# Patient Record
Sex: MALE | Race: WHITE | NOT HISPANIC OR LATINO | Employment: OTHER | URBAN - METROPOLITAN AREA
[De-identification: names, ages, dates, MRNs, and addresses within clinical notes are randomized per-mention and may not be internally consistent; named-entity substitution may affect disease eponyms.]

---

## 2018-06-06 ENCOUNTER — APPOINTMENT (OUTPATIENT)
Dept: GENERAL RADIOLOGY | Facility: CLINIC | Age: 78
DRG: 872 | End: 2018-06-06
Attending: EMERGENCY MEDICINE
Payer: MEDICARE

## 2018-06-06 ENCOUNTER — HOSPITAL ENCOUNTER (INPATIENT)
Facility: CLINIC | Age: 78
LOS: 4 days | Discharge: HOME | DRG: 872 | End: 2018-06-10
Attending: EMERGENCY MEDICINE | Admitting: INTERNAL MEDICINE
Payer: MEDICARE

## 2018-06-06 ENCOUNTER — APPOINTMENT (OUTPATIENT)
Dept: CT IMAGING | Facility: CLINIC | Age: 78
DRG: 872 | End: 2018-06-06
Attending: EMERGENCY MEDICINE
Payer: MEDICARE

## 2018-06-06 DIAGNOSIS — A04.72 C. DIFFICILE COLITIS: Primary | ICD-10-CM

## 2018-06-06 DIAGNOSIS — R53.1 GENERALIZED WEAKNESS: ICD-10-CM

## 2018-06-06 PROBLEM — R65.10 SIRS (SYSTEMIC INFLAMMATORY RESPONSE SYNDROME) (H): Status: ACTIVE | Noted: 2018-06-06

## 2018-06-06 LAB
ALBUMIN SERPL-MCNC: 3.6 G/DL (ref 3.4–5)
ALBUMIN UR-MCNC: NEGATIVE MG/DL
ALP SERPL-CCNC: 102 U/L (ref 40–150)
ALT SERPL W P-5'-P-CCNC: 45 U/L (ref 0–70)
ANION GAP SERPL CALCULATED.3IONS-SCNC: 7 MMOL/L (ref 3–14)
APPEARANCE UR: CLEAR
AST SERPL W P-5'-P-CCNC: 46 U/L (ref 0–45)
BASOPHILS # BLD AUTO: 0 10E9/L (ref 0–0.2)
BASOPHILS NFR BLD AUTO: 0.6 %
BILIRUB SERPL-MCNC: 1 MG/DL (ref 0.2–1.3)
BILIRUB UR QL STRIP: NEGATIVE
BUN SERPL-MCNC: 14 MG/DL (ref 7–30)
CALCIUM SERPL-MCNC: 8.5 MG/DL (ref 8.5–10.1)
CHLORIDE SERPL-SCNC: 107 MMOL/L (ref 94–109)
CO2 SERPL-SCNC: 24 MMOL/L (ref 20–32)
COLOR UR AUTO: YELLOW
CREAT SERPL-MCNC: 1.34 MG/DL (ref 0.66–1.25)
DIFFERENTIAL METHOD BLD: ABNORMAL
EOSINOPHIL # BLD AUTO: 0 10E9/L (ref 0–0.7)
EOSINOPHIL NFR BLD AUTO: 0.3 %
ERYTHROCYTE [DISTWIDTH] IN BLOOD BY AUTOMATED COUNT: 14 % (ref 10–15)
FLUAV+FLUBV RNA SPEC QL NAA+PROBE: NEGATIVE
FLUAV+FLUBV RNA SPEC QL NAA+PROBE: NEGATIVE
GFR SERPL CREATININE-BSD FRML MDRD: 51 ML/MIN/1.7M2
GLUCOSE SERPL-MCNC: 100 MG/DL (ref 70–99)
GLUCOSE UR STRIP-MCNC: NEGATIVE MG/DL
HCT VFR BLD AUTO: 44.6 % (ref 40–53)
HGB BLD-MCNC: 15.1 G/DL (ref 13.3–17.7)
HGB UR QL STRIP: NEGATIVE
IMM GRANULOCYTES # BLD: 0 10E9/L (ref 0–0.4)
IMM GRANULOCYTES NFR BLD: 0.3 %
KETONES UR STRIP-MCNC: NEGATIVE MG/DL
LACTATE BLD-SCNC: 1.6 MMOL/L (ref 0.4–1.9)
LEUKOCYTE ESTERASE UR QL STRIP: NEGATIVE
LYMPHOCYTES # BLD AUTO: 0.7 10E9/L (ref 0.8–5.3)
LYMPHOCYTES NFR BLD AUTO: 10.6 %
MCH RBC QN AUTO: 29.5 PG (ref 26.5–33)
MCHC RBC AUTO-ENTMCNC: 33.9 G/DL (ref 31.5–36.5)
MCV RBC AUTO: 87 FL (ref 78–100)
MONOCYTES # BLD AUTO: 0.4 10E9/L (ref 0–1.3)
MONOCYTES NFR BLD AUTO: 6.4 %
MUCOUS THREADS #/AREA URNS LPF: PRESENT /LPF
NEUTROPHILS # BLD AUTO: 5.1 10E9/L (ref 1.6–8.3)
NEUTROPHILS NFR BLD AUTO: 81.8 %
NITRATE UR QL: NEGATIVE
NRBC # BLD AUTO: 0 10*3/UL
NRBC BLD AUTO-RTO: 0 /100
PH UR STRIP: 6 PH (ref 5–7)
PLATELET # BLD AUTO: 186 10E9/L (ref 150–450)
POTASSIUM SERPL-SCNC: 4.3 MMOL/L (ref 3.4–5.3)
PROT SERPL-MCNC: 7.8 G/DL (ref 6.8–8.8)
RBC # BLD AUTO: 5.11 10E12/L (ref 4.4–5.9)
RBC #/AREA URNS AUTO: 1 /HPF (ref 0–2)
RSV RNA SPEC NAA+PROBE: NEGATIVE
SODIUM SERPL-SCNC: 138 MMOL/L (ref 133–144)
SOURCE: ABNORMAL
SP GR UR STRIP: 1.01 (ref 1–1.03)
SPECIMEN SOURCE: NORMAL
SQUAMOUS #/AREA URNS AUTO: <1 /HPF (ref 0–1)
TROPONIN I SERPL-MCNC: <0.015 UG/L (ref 0–0.04)
UROBILINOGEN UR STRIP-MCNC: NORMAL MG/DL (ref 0–2)
WBC # BLD AUTO: 6.2 10E9/L (ref 4–11)
WBC #/AREA URNS AUTO: 1 /HPF (ref 0–5)

## 2018-06-06 PROCEDURE — 99223 1ST HOSP IP/OBS HIGH 75: CPT | Mod: AI | Performed by: INTERNAL MEDICINE

## 2018-06-06 PROCEDURE — 93005 ELECTROCARDIOGRAM TRACING: CPT

## 2018-06-06 PROCEDURE — 74177 CT ABD & PELVIS W/CONTRAST: CPT

## 2018-06-06 PROCEDURE — 96374 THER/PROPH/DIAG INJ IV PUSH: CPT

## 2018-06-06 PROCEDURE — 85025 COMPLETE CBC W/AUTO DIFF WBC: CPT | Performed by: EMERGENCY MEDICINE

## 2018-06-06 PROCEDURE — A9270 NON-COVERED ITEM OR SERVICE: HCPCS | Mod: GY | Performed by: EMERGENCY MEDICINE

## 2018-06-06 PROCEDURE — 25000128 H RX IP 250 OP 636: Performed by: INTERNAL MEDICINE

## 2018-06-06 PROCEDURE — 86617 LYME DISEASE ANTIBODY: CPT | Performed by: EMERGENCY MEDICINE

## 2018-06-06 PROCEDURE — 25000128 H RX IP 250 OP 636: Performed by: EMERGENCY MEDICINE

## 2018-06-06 PROCEDURE — 81001 URINALYSIS AUTO W/SCOPE: CPT | Performed by: EMERGENCY MEDICINE

## 2018-06-06 PROCEDURE — 87040 BLOOD CULTURE FOR BACTERIA: CPT | Performed by: EMERGENCY MEDICINE

## 2018-06-06 PROCEDURE — 36415 COLL VENOUS BLD VENIPUNCTURE: CPT

## 2018-06-06 PROCEDURE — 84484 ASSAY OF TROPONIN QUANT: CPT | Performed by: EMERGENCY MEDICINE

## 2018-06-06 PROCEDURE — 87631 RESP VIRUS 3-5 TARGETS: CPT | Performed by: EMERGENCY MEDICINE

## 2018-06-06 PROCEDURE — 80053 COMPREHEN METABOLIC PANEL: CPT | Performed by: EMERGENCY MEDICINE

## 2018-06-06 PROCEDURE — 99285 EMERGENCY DEPT VISIT HI MDM: CPT | Mod: 25

## 2018-06-06 PROCEDURE — 71046 X-RAY EXAM CHEST 2 VIEWS: CPT

## 2018-06-06 PROCEDURE — A9270 NON-COVERED ITEM OR SERVICE: HCPCS | Mod: GY | Performed by: INTERNAL MEDICINE

## 2018-06-06 PROCEDURE — 25000132 ZZH RX MED GY IP 250 OP 250 PS 637: Mod: GY | Performed by: INTERNAL MEDICINE

## 2018-06-06 PROCEDURE — 96361 HYDRATE IV INFUSION ADD-ON: CPT

## 2018-06-06 PROCEDURE — 12000000 ZZH R&B MED SURG/OB

## 2018-06-06 PROCEDURE — 25000132 ZZH RX MED GY IP 250 OP 250 PS 637: Mod: GY | Performed by: EMERGENCY MEDICINE

## 2018-06-06 PROCEDURE — 83605 ASSAY OF LACTIC ACID: CPT | Performed by: EMERGENCY MEDICINE

## 2018-06-06 PROCEDURE — 25000125 ZZHC RX 250: Performed by: EMERGENCY MEDICINE

## 2018-06-06 RX ORDER — IOPAMIDOL 755 MG/ML
111 INJECTION, SOLUTION INTRAVASCULAR ONCE
Status: COMPLETED | OUTPATIENT
Start: 2018-06-06 | End: 2018-06-06

## 2018-06-06 RX ORDER — ACETAMINOPHEN 325 MG/1
650 TABLET ORAL EVERY 4 HOURS PRN
Status: DISCONTINUED | OUTPATIENT
Start: 2018-06-06 | End: 2018-06-10 | Stop reason: HOSPADM

## 2018-06-06 RX ORDER — IBUPROFEN 400 MG/1
400 TABLET, FILM COATED ORAL EVERY 8 HOURS PRN
Status: DISCONTINUED | OUTPATIENT
Start: 2018-06-06 | End: 2018-06-10 | Stop reason: HOSPADM

## 2018-06-06 RX ORDER — ALBUTEROL SULFATE 90 UG/1
1-2 AEROSOL, METERED RESPIRATORY (INHALATION) EVERY 6 HOURS PRN
COMMUNITY

## 2018-06-06 RX ORDER — OXYCODONE HYDROCHLORIDE 5 MG/1
5 TABLET ORAL EVERY 4 HOURS PRN
Status: DISCONTINUED | OUTPATIENT
Start: 2018-06-06 | End: 2018-06-10 | Stop reason: HOSPADM

## 2018-06-06 RX ORDER — ONDANSETRON 2 MG/ML
4 INJECTION INTRAMUSCULAR; INTRAVENOUS EVERY 6 HOURS PRN
Status: DISCONTINUED | OUTPATIENT
Start: 2018-06-06 | End: 2018-06-10 | Stop reason: HOSPADM

## 2018-06-06 RX ORDER — ACETAMINOPHEN 500 MG
1000 TABLET ORAL ONCE
Status: COMPLETED | OUTPATIENT
Start: 2018-06-06 | End: 2018-06-06

## 2018-06-06 RX ORDER — LIDOCAINE 40 MG/G
CREAM TOPICAL
Status: DISCONTINUED | OUTPATIENT
Start: 2018-06-06 | End: 2018-06-10 | Stop reason: HOSPADM

## 2018-06-06 RX ORDER — SODIUM CHLORIDE 9 MG/ML
INJECTION, SOLUTION INTRAVENOUS CONTINUOUS
Status: DISCONTINUED | OUTPATIENT
Start: 2018-06-06 | End: 2018-06-10

## 2018-06-06 RX ORDER — HEPARIN SODIUM 5000 [USP'U]/.5ML
5000 INJECTION, SOLUTION INTRAVENOUS; SUBCUTANEOUS EVERY 12 HOURS
Status: DISCONTINUED | OUTPATIENT
Start: 2018-06-06 | End: 2018-06-10 | Stop reason: HOSPADM

## 2018-06-06 RX ORDER — ACETAMINOPHEN 650 MG/1
650 SUPPOSITORY RECTAL EVERY 4 HOURS PRN
Status: DISCONTINUED | OUTPATIENT
Start: 2018-06-06 | End: 2018-06-10 | Stop reason: HOSPADM

## 2018-06-06 RX ORDER — ALBUTEROL SULFATE 90 UG/1
1-2 AEROSOL, METERED RESPIRATORY (INHALATION) EVERY 6 HOURS PRN
Status: DISCONTINUED | OUTPATIENT
Start: 2018-06-06 | End: 2018-06-10 | Stop reason: HOSPADM

## 2018-06-06 RX ORDER — NALOXONE HYDROCHLORIDE 0.4 MG/ML
.1-.4 INJECTION, SOLUTION INTRAMUSCULAR; INTRAVENOUS; SUBCUTANEOUS
Status: DISCONTINUED | OUTPATIENT
Start: 2018-06-06 | End: 2018-06-10 | Stop reason: HOSPADM

## 2018-06-06 RX ORDER — PIPERACILLIN SODIUM, TAZOBACTAM SODIUM 3; .375 G/15ML; G/15ML
3.38 INJECTION, POWDER, LYOPHILIZED, FOR SOLUTION INTRAVENOUS ONCE
Status: COMPLETED | OUTPATIENT
Start: 2018-06-06 | End: 2018-06-06

## 2018-06-06 RX ORDER — ONDANSETRON 4 MG/1
4 TABLET, ORALLY DISINTEGRATING ORAL EVERY 6 HOURS PRN
Status: DISCONTINUED | OUTPATIENT
Start: 2018-06-06 | End: 2018-06-10 | Stop reason: HOSPADM

## 2018-06-06 RX ORDER — IPRATROPIUM BROMIDE AND ALBUTEROL SULFATE 2.5; .5 MG/3ML; MG/3ML
3 SOLUTION RESPIRATORY (INHALATION) EVERY 4 HOURS PRN
Status: DISCONTINUED | OUTPATIENT
Start: 2018-06-06 | End: 2018-06-10 | Stop reason: HOSPADM

## 2018-06-06 RX ORDER — LISINOPRIL 20 MG/1
20 TABLET ORAL DAILY
Status: DISCONTINUED | OUTPATIENT
Start: 2018-06-07 | End: 2018-06-10 | Stop reason: HOSPADM

## 2018-06-06 RX ORDER — PIPERACILLIN SODIUM, TAZOBACTAM SODIUM 3; .375 G/15ML; G/15ML
3.38 INJECTION, POWDER, LYOPHILIZED, FOR SOLUTION INTRAVENOUS EVERY 6 HOURS
Status: DISCONTINUED | OUTPATIENT
Start: 2018-06-07 | End: 2018-06-07

## 2018-06-06 RX ADMIN — SODIUM CHLORIDE: 9 INJECTION, SOLUTION INTRAVENOUS at 21:13

## 2018-06-06 RX ADMIN — HEPARIN SODIUM 5000 UNITS: 5000 INJECTION, SOLUTION INTRAVENOUS; SUBCUTANEOUS at 22:05

## 2018-06-06 RX ADMIN — SODIUM CHLORIDE 1000 ML: 9 INJECTION, SOLUTION INTRAVENOUS at 15:52

## 2018-06-06 RX ADMIN — FLUTICASONE FUROATE AND VILANTEROL TRIFENATATE 1 PUFF: 100; 25 POWDER RESPIRATORY (INHALATION) at 23:52

## 2018-06-06 RX ADMIN — IOPAMIDOL 111 ML: 755 INJECTION, SOLUTION INTRAVENOUS at 16:49

## 2018-06-06 RX ADMIN — SODIUM CHLORIDE 74 ML: 9 INJECTION, SOLUTION INTRAVENOUS at 16:50

## 2018-06-06 RX ADMIN — PIPERACILLIN SODIUM,TAZOBACTAM SODIUM 3.38 G: 3; .375 INJECTION, POWDER, FOR SOLUTION INTRAVENOUS at 20:18

## 2018-06-06 RX ADMIN — ACETAMINOPHEN 1000 MG: 500 TABLET, FILM COATED ORAL at 16:35

## 2018-06-06 RX ADMIN — IBUPROFEN 400 MG: 400 TABLET ORAL at 22:01

## 2018-06-06 ASSESSMENT — ENCOUNTER SYMPTOMS
FREQUENCY: 0
ABDOMINAL PAIN: 0
DIARRHEA: 0
HEADACHES: 0
WEAKNESS: 1
BACK PAIN: 0
COUGH: 0
RHINORRHEA: 0
APPETITE CHANGE: 1
NECK PAIN: 0
FEVER: 1
DYSURIA: 0

## 2018-06-06 NOTE — IP AVS SNAPSHOT
[address], [address]    [facility] [address]    Phone:  [phone]                                       After Visit Summary   6/6/2018    Jamie Urban    MRN: [id number]           After Visit Summary Signature Page     I have received my discharge instructions, and my questions have been answered. I have discussed any challenges I see with this plan with the nurse or doctor.    ..........................................................................................................................................  Patient/Patient Representative Signature      ..........................................................................................................................................  Patient Representative Print Name and Relationship to Patient    ..................................................               ................................................  Date                                            Time    ..........................................................................................................................................  Reviewed by Signature/Title    ...................................................              ..............................................  Date                                                            Time

## 2018-06-06 NOTE — ED NOTES
[facility]  ED Nurse Handoff Report    ED Chief complaint: Generalized Weakness (Fever 102.6 oral. )      ED Diagnosis:   Final diagnoses:   Generalized weakness       Code Status: Full Code    Allergies:   Allergies   Allergen Reactions     Sulfa Drugs      Varenicline      Other reaction(s): Hallucinations     Bupropion Anxiety     Other reaction(s): Tremors  Patient unaware       Activity level - Baseline/Home:  Stand with Assist    Activity Level - Current:   Stand with Assist of 2     Needed?: No    Isolation: No  Infection: Not Applicable    Bariatric?: No    Vital Signs:   Vitals:    06/06/18 1730 06/06/18 1800 06/06/18 1830 06/06/18 1900   BP: 101/70 112/70 124/86 130/86   Resp: 16 18 18 14   Temp:       TempSrc:       SpO2: 93% 94% 95% 92%   Weight:       Height:           Cardiac Rhythm: ,    RBBB on EKG    Pain level: 0-10 Pain Scale: 8    Is this patient confused?: No   Suicidality: Screened negative    Patient Report: Initial Complaint: Pt with hx of COPD/diverticulitis presents to ED for inc weakness, fever and nausea that started last night. Pt has had dec appetite and did not have a BM yet today.  2 wks ago pt was seen for diverticulitis. Pt had temp 102.4F on arrival to ED and reported SOB which family says is normal for pt. Hx of lyme disease and family says his sx were similar last time he had a flare up. Improved sx after 1L 0.9NS bolus  Focused Assessment: AOx3. Skin hot/dry on arrival. No distress. Lungs cta.   Tests Performed: labs, CT, xray, EKG  Abnormal Results:   Results for orders placed or performed during the hospital encounter of 06/06/18   XR Chest 2 Views    Narrative    CHEST TWO VIEWS   6/6/2018 5:00 PM     HISTORY: Fever, SIRS, evaluate pneumonia.     COMPARISON: None.    FINDINGS: Elevated right hemidiaphragm. Chest otherwise negative.      Impression    IMPRESSION: Elevated right hemidiaphragm. Nothing acute.   CT Abdomen Pelvis w Contrast     Narrative    CT ABDOMEN AND PELVIS WITH CONTRAST   6/6/2018 4:53 PM     HISTORY: Recently diagnosed diverticulitis. Now with mild left lower  quadrant tenderness.    COMPARISON: 2/22/2014.    TECHNIQUE: Following the uneventful administration of 111mL Isovue-370  intravenous contrast, helical sections were acquired from the top of  the diaphragm through the pubic symphysis. Coronal reconstructions  were generated. Radiation dose for this scan was reduced using  automated exposure control, adjustment of the mA and/or kV according  to the patient's size, or iterative reconstruction technique.    FINDINGS:     Abdomen: The liver is unremarkable. Mild splenomegaly. The spleen  measures 13.9 cm in craniocaudal dimension. A few punctate  calcifications seen in the pancreas may relate to chronic  pancreatitis. The adrenal glands are unremarkable. Two small cysts in  the right kidney. A few subcentimeter low-attenuation lesions in both  kidneys, too small to characterize. Two nonobstructing calculi in the  inferior pole of the left kidney, the largest measuring 0.5 cm. The  gallbladder is present. No enlarged lymph nodes or free fluid in the  upper abdomen. Atherosclerotic calcification in the abdominal aorta.  Mild aneurysmal dilatation of the distal abdominal aorta which  measures 3.6 x 3.2 cm in axial dimensions. 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 Marked elevation  of the right hemidiaphragm.    Scan through the lower chest is significant for atelectasis in the  right lung base. Coronary artery calcification.    Pelvis: The small and large bowel are normal in caliber. Several  colonic diverticula are present without convincing evidence of  diverticulitis. Apparent mild wall thickening of the cecum and  proximal ascending colon. Slight haziness is present in the fat about  the thick-walled colon. The appendix is not visualized. No pneumatosis  or free intraperitoneal gas. No  enlarged lymph nodes or free fluid in  the pelvis. Small right inguinal hernia containing fat.      Impression    IMPRESSION:   1. Apparent mild wall thickening of the cecum and proximal ascending  colon. This is suggestive of infectious or inflammatory colitis.  Recommend correlation with patient's symptoms.  2. No other cause of acute pain identified in the abdomen or pelvis.  3. Colonic diverticulosis without convincing evidence of  diverticulitis.  4. Mild splenomegaly.  5. Two nonobstructing left renal calculi.  6. 3.6 cm distal abdominal aortic aneurysm.   Comprehensive metabolic panel   Result Value Ref Range    Sodium 138 133 - 144 mmol/L    Potassium 4.3 3.4 - 5.3 mmol/L    Chloride 107 94 - 109 mmol/L    Carbon Dioxide 24 20 - 32 mmol/L    Anion Gap 7 3 - 14 mmol/L    Glucose 100 (H) 70 - 99 mg/dL    Urea Nitrogen 14 7 - 30 mg/dL    Creatinine 1.34 (H) 0.66 - 1.25 mg/dL    GFR Estimate 51 (L) >60 mL/min/1.7m2    GFR Estimate If Black 62 >60 mL/min/1.7m2    Calcium 8.5 8.5 - 10.1 mg/dL    Bilirubin Total 1.0 0.2 - 1.3 mg/dL    Albumin 3.6 3.4 - 5.0 g/dL    Protein Total 7.8 6.8 - 8.8 g/dL    Alkaline Phosphatase 102 40 - 150 U/L    ALT 45 0 - 70 U/L    AST 46 (H) 0 - 45 U/L   CBC with platelets differential   Result Value Ref Range    WBC 6.2 4.0 - 11.0 10e9/L    RBC Count 5.11 4.4 - 5.9 10e12/L    Hemoglobin 15.1 13.3 - 17.7 g/dL    Hematocrit 44.6 40.0 - 53.0 %    MCV 87 78 - 100 fl    MCH 29.5 26.5 - 33.0 pg    MCHC 33.9 31.5 - 36.5 g/dL    RDW 14.0 10.0 - 15.0 %    Platelet Count 186 150 - 450 10e9/L    Diff Method Automated Method     % Neutrophils 81.8 %    % Lymphocytes 10.6 %    % Monocytes 6.4 %    % Eosinophils 0.3 %    % Basophils 0.6 %    % Immature Granulocytes 0.3 %    Nucleated RBCs 0 0 /100    Absolute Neutrophil 5.1 1.6 - 8.3 10e9/L    Absolute Lymphocytes 0.7 (L) 0.8 - 5.3 10e9/L    Absolute Monocytes 0.4 0.0 - 1.3 10e9/L    Absolute Eosinophils 0.0 0.0 - 0.7 10e9/L    Absolute Basophils  0.0 0.0 - 0.2 10e9/L    Abs Immature Granulocytes 0.0 0 - 0.4 10e9/L    Absolute Nucleated RBC 0.0    Lactate for Sepsis Protocol   Result Value Ref Range    Lactate for Sepsis Protocol 1.6 0.4 - 1.9 mmol/L   UA with Microscopic   Result Value Ref Range    Color Urine Yellow     Appearance Urine Clear     Glucose Urine Negative NEG^Negative mg/dL    Bilirubin Urine Negative NEG^Negative    Ketones Urine Negative NEG^Negative mg/dL    Specific Gravity Urine 1.015 1.003 - 1.035    Blood Urine Negative NEG^Negative    pH Urine 6.0 5.0 - 7.0 pH    Protein Albumin Urine Negative NEG^Negative mg/dL    Urobilinogen mg/dL Normal 0.0 - 2.0 mg/dL    Nitrite Urine Negative NEG^Negative    Leukocyte Esterase Urine Negative NEG^Negative    Source Midstream Urine     WBC Urine 1 0 - 5 /HPF    RBC Urine 1 0 - 2 /HPF    Squamous Epithelial /HPF Urine <1 0 - 1 /HPF    Mucous Urine Present (A) NEG^Negative /LPF   Troponin I   Result Value Ref Range    Troponin I ES <0.015 0.000 - 0.045 ug/L       Treatments provided: 0.9NS 1L bolus     Family Comments: wife and son    OBS brochure/video discussed/provided to patient: N/A    ED Medications:   Medications   0.9% sodium chloride BOLUS (0 mLs Intravenous Stopped 6/6/18 1638)   acetaminophen (TYLENOL) tablet 1,000 mg (1,000 mg Oral Given 6/6/18 1635)   100mL saline flush (74 mLs Intravenous Given 6/6/18 1650)   iopamidol (ISOVUE-370) solution 111 mL (111 mLs Intravenous Given 6/6/18 1649)       Drips infusing?:  No    For the majority of the shift this patient was Green.   Interventions performed were .    Severe Sepsis OR Septic Shock Diagnosis Present: No      ED NURSE PHONE NUMBER: [phone]

## 2018-06-06 NOTE — ED PROVIDER NOTES
History     Chief Complaint:    Generalized Weakness       HPI   Jamie Urban is a 77 year old male who presents to the ED today with generalized weakness. The patient states that he started to feel weak last night, but his family reports that he has been like this for some time now. He notes that he had Diverticulitis two weeks ago but no longer has any abdominal pain. The patient denies any diarrhea, chest pain, headache, neck pain, back pain, cough, rhinorrhea, dysuria, or urinary frequency. He does note to having a fever of about 102.6 oral and some shortness of breath, but states that this is normal for him. Due to his weakness, he states that he has pain whenever he walks to the bathroom. The patient states that he has been drinking fluids normally, but has not had much of an appetite recently. He denies any recent ill contacts, other than his wife, who was seen here in the ED 10 days ago for cellulitis. Of note, the patient reports that he was diagnosed with Lyme disease a few years ago and the weakness that he is having today is similar to when he had his Lyme disease then.       Allergies:  Sulfa drugs   Varenicline   Bupropion      Medications:    Roxicodone   Fosamax   Dulera   Lisinopril   Albuterol     Past Medical History:    Hyperparathyroidism   Diverticulitis   Sleep apnea   Hypertension   Hyperlipidemia   Osteoporosis   COPD    Past Surgical History:    Laser holmium lithotripsy pancreas  Vasectomy   Parathyroidectomy     Family History:    History reviewed. No pertinent family history.     Social History:  Marital Status:    Presents to the ED with wife and son   Tobacco Use: former smoker   Alcohol Use: yes   PCP: See [facility]     Review of Systems   Constitutional: Positive for appetite change and fever.   HENT: Negative for rhinorrhea.    Respiratory: Negative for cough.    Cardiovascular: Negative for chest pain.   Gastrointestinal: Negative for abdominal pain and diarrhea.  "  Genitourinary: Negative for dysuria and frequency.   Musculoskeletal: Negative for back pain and neck pain.   Neurological: Positive for weakness. Negative for headaches.   All other systems reviewed and are negative.      Physical Exam     Patient Vitals for the past 24 hrs:   BP Temp Temp src Heart Rate Resp SpO2 Height Weight   06/06/18 2000 134/90 100.7  F (38.2  C) Oral 105 18 96 % - -   06/06/18 1900 130/86 - - 98 14 92 % - -   06/06/18 1830 124/86 - - 96 18 95 % - -   06/06/18 1800 112/70 - - 100 18 94 % - -   06/06/18 1730 101/70 - - 105 16 93 % - -   06/06/18 1700 - - - 112 20 93 % - -   06/06/18 1635 - 99.2  F (37.3  C) Oral 114 26 94 % - -   06/06/18 1630 143/90 - - 117 13 93 % - -   06/06/18 1620 141/90 - - 120 21 92 % - -   06/06/18 1600 - - - 121 12 92 % - -   06/06/18 1534 (!) 137/97 102.4  F (39.1  C) Oral 137 18 92 % 1.727 m (5' 8\") 99.8 kg (220 lb)        Physical Exam  Constitutional: Well developed, nontox appearance, nontox appearance, pleasant  Head: Atraumatic.   Mouth/Throat: Oropharynx is clear and moist.   Neck:  no stridor  Eyes: no scleral icterus  Cardiovascular: Reg tachycardia, 2+ bilat radial pulses  Pulmonary/Chest: mildly increased resp effort, Clear BS bilat, resp somewhat shallow  Abdominal: ND, +BS, soft, mild LLQ tenderness, no rebound or guarding   : no CVA tenderness bilat  Ext: Warm, well perfused, no edema  Neurological: A&O, symmetric facies, moves ext x4  Skin: Skin is warm and dry. No erythema  Psychiatric: Behavior is normal. Thought content normal.   Nursing note and vitals reviewed.    Emergency Department Course   ECG:  @ 1543  Indication: weakness   Vent. Rate 129 bpm. LA interval 152 ms. QRS duration 126 ms. QT/QTc 326/477 ms. P-R-T axis -2 -77 83.   Sinus tachycardia. Right bundle branch block. Left anterior fascicular block. Bifascicular block. Possible lateral infarct, age undetermined. Abnormal ECG.   Read @ 1550 by Dr. Haley.     Imaging:  Xray chest, 2 " views   Elevated right hemidiaphragm. Nothing acute.  Preliminary radiology read.        CT abdomen pelvis w contrast   1. Apparent mild wall thickening of the cecum and proximal ascending  colon. This is suggestive of infectious or inflammatory colitis.  Recommend correlation with patient's symptoms.  2. No other cause of acute pain identified in the abdomen or pelvis.  3. Colonic diverticulosis without convincing evidence of  diverticulitis.  4. Mild splenomegaly.  5. Two nonobstructing left renal calculi.  6. 3.6 cm distal abdominal aortic aneurysm.  Preliminary radiology read.     Radiographic findings were communicated with the patient and family who voiced understanding of the findings.    Laboratory:  CMP: glucose 100 (H), creatinine 1.34 (H) , GFR estimate 51 (L), AST 46 (H), otherwise WNL   CBC:  WBC 6.2, HGB 15.1,   Lactate for sepsis protocol: 1.6  Troponin I: <0.015   Lyme conf IgG and IgM Immunoblot: pending    Influenza A/B and RSV PCR: pending   Blood culture x2: pending   UA: Clear yellow urine, mucous present otherwise WNL     Interventions:  (1552) Normal Saline, 1 liter, IV bolus   (1653) Tylenol 1,000 mg, PO    (2018) Zosyn 3.375 g, IV      Emergency Department Course:  Nursing notes and vitals reviewed.  (1603) I performed an exam of the patient as documented above.    EKG was done, interpretation as above.   A peripheral IV was established. Blood was drawn from the patient. This was sent for laboratory testing, findings above.    Urine sample was obtained and sent for laboratory analysis, findings above.   The patient was sent for a chest xray and an abdomen/pelvis CT while in the emergency department, findings above.    (1802) I rechecked on the patient and updated them on results.    Findings and plan explained to the patient and his family who consents to admission.   (1835) I discussed the patient with Dr. Tapia of the hospitalist service, who will admit the patient to an  observation bed for further monitoring, evaluation, and treatment.   (2007) After Dr. Tapia saw the patient in the ED, she recommended that the patient be admitted to an inpatient bed instead.   Impression & Plan    Medical Decision Making:  Jamie Urban is a 77 year old male who presents with generalized weakness, tachycardia, and fever. Differential diagnosis includes sepsis, severe sepsis, SIRS, viral illness, intra-abdominal abscess, or complication from diverticulitis, UTI, pneumonia, bacteremia, influenza, acute Lyme disease.  Labs are as noted above and reassuring given lactate less than 2, white blood cell count within normal limits, creatinine at baseline.  Influenza and Lyme tests pending.  Chest x-ray negative for acute cardiopulmonary disease.  CT abdomen pelvis was ordered which showed mild thickening of the cecum and ascending colon which is somewhat nonspecific for infectious versus inflammatory findings.  Patient was given Tylenol, fluids with improvement in his vital signs and resolution of his fever.  Given his reassuring lab workup, somewhat unimpressive imaging, nontoxic appearance I think it would be reasonable to hold antibiotics at this time.  Given his initial vital sign abnormalities and his age, I think be reasonable to admit the patient for observation for further evaluation and management.  Recommendations given regarding admission.  Counseled on all results, disposition and diagnosis.  Patient and family understanding and agreeable to plan. Patient admitted in stable condition.   After initial discussion with hospitalist regarding admission, rediscussed pt with hospitalist recs to start zosyn and admit to inpatient instead of observation.    Diagnosis:    ICD-10-CM    1. Generalized weakness R53.1        Disposition:  Admitted to hospitalist.     I, Johanny Carter, am serving as a scribe on 6/6/2018 at 4:03 PM to personally document services performed by Dr. Haley based on my  observations and the provider's statements to me.   6/6/2018    EMERGENCY DEPARTMENT       Johnathan Haley MD  06/07/18 0113

## 2018-06-06 NOTE — IP AVS SNAPSHOT
MRN:[id number]                      After Visit Summary   6/6/2018    Jamie Urban    MRN: [id number]           Thank you!     Thank you for choosing [facility] for your care. Our goal is always to provide you with excellent care. Hearing back from our patients is one way we can continue to improve our services. Please take a few minutes to complete the written survey that you may receive in the mail after you visit with us. Thank you!        Patient Information     Date Of Birth          1940        Designated Caregiver       Most Recent Value    Caregiver    Will someone help with your care after discharge? yes    Name of designated caregiver Carolyne Urban    Phone number of caregiver [phone]    Caregiver address [address] [place] [address]      About your hospital stay     You were admitted on:  June 6, 2018 You last received care in the:  [place] [address] [facility]    You were discharged on:  Claudia 10, 2018        Reason for your hospital stay       C diff colitis                  Who to Call     For medical emergencies, please call 911.  For non-urgent questions about your medical care, please call your primary care provider or clinic, [phone]          Attending Provider     Provider Specialty    Johnathan Haley MD Emergency Medicine    Nistor, Xuan Reid MD Internal Medicine       Primary Care Provider Office Phone # Fax #    See Huffman [phone]      After Care Instructions     Diet       Follow this diet upon discharge: Orders Placed This Encounter      Low Saturated Fat Na <2400 mg                  Follow-up Appointments     Follow-up and recommended labs and tests        Follow up with primary care provider, See Huffman, within 7 days for hospital follow- up.  No follow up labs or test are needed.    Follow up with GI clinic, Dr Diaz within 1 week.                  Pending Results     Date and Time Order Name Status  "Description    2018 1626 Blood culture Preliminary     2018 1626 Blood culture Preliminary     2018 1611 Blood culture ONE site Preliminary     2018 1556 Blood culture Preliminary             Statement of Approval     Ordered          06/10/18 1300  I have reviewed and agree with all the recommendations and orders detailed in this document.  EFFECTIVE NOW     Approved and electronically signed by:  Xuan Tapia MD             Admission Information     Date & Time Provider Department Dept. Phone    2018 Xuan Tapia MD [place] [address] [facility] [phone]      Your Vitals Were     Blood Pressure Temperature Respirations Height Weight Pulse Oximetry    143/86 97.4  F (36.3  C) 18 1.727 m (5' 8\") 102.3 kg (225 lb 9.6 oz) 95%    BMI (Body Mass Index)                   34.3 kg/m2           MyChart Information     [facility] lets you send messages to your doctor, view your test results, renew your prescriptions, schedule appointments and more. To sign up, go to [email or web address] . Click on \"Log in\" on the left side of the screen, which will take you to the Welcome page. Then click on \"Sign up Now\" on the right side of the page.     You will be asked to enter the access code listed below, as well as some personal information. Please follow the directions to create your username and password.     Your access code is: [email or web address]  Expires: 2018 12:58 PM     Your access code will  in 90 days. If you need help or a new code, please call your [facility] clinic or [phone].        Care EveryWhere ID     This is your Care EveryWhere ID. This could be used by other organizations to access your [facility] medical records  [id number]        Equal Access to Services     DAVID MELENDEZ : Francine Hubbard, darin parada, lianne oviedo. So [facility] [phone].    ATENCIÓN: Si habla español, tiene a taveras " disposición servicios gratuitos de asistencia lingüística. Eliud ledezma [phone].    We comply with applicable federal civil rights laws and [place] laws. We do not discriminate on the basis of race, color, national origin, age, disability, sex, sexual orientation, or gender identity.               Review of your medicines      START taking        Dose / Directions    cholestyramine 4 g Packet   Commonly known as:  QUESTRAN   Used for:  C. difficile colitis        Dose:  1 packet   Take 1 packet (4 g) by mouth 2 times daily for 10 days   Quantity:  90 each   Refills:  0       vancomycin 50 MG/ML Soln   Indication:  Clostridium difficile Bacteria   Used for:  C. difficile colitis        Dose:  125 mg   Take 2.5 mLs (125 mg) by mouth 4 times daily for 11 days   Quantity:  110 mL   Refills:  0         CONTINUE these medicines which have NOT CHANGED        Dose / Directions    albuterol 108 (90 Base) MCG/ACT Inhaler   Commonly known as:  PROAIR HFA/PROVENTIL HFA/VENTOLIN HFA        Dose:  1-2 puff   Inhale 1-2 puffs into the lungs every 6 hours as needed for shortness of breath / dyspnea or wheezing   Refills:  0       ALENDRONATE SODIUM PO        Dose:  70 mg   Take 70 mg by mouth once a week   Refills:  0       LISINOPRIL PO        Dose:  20 mg   Take 20 mg by mouth daily   Refills:  0       mometasone-formoterol 100-5 MCG/ACT oral inhaler   Commonly known as:  DULERA        Dose:  2 puff   Inhale 2 puffs into the lungs daily   Refills:  0            Where to get your medicines      These medications were sent to [facility] - [address]  [address] [place] [address] [address]     Phone:  [phone]     cholestyramine 4 g Packet    vancomycin 50 MG/ML Soln                Protect others around you: Learn how to safely use, store and throw away your medicines at www.disposemymeds.org.        ANTIBIOTIC INSTRUCTION     You've Been Prescribed an Antibiotic - Now What?  Your  healthcare team thinks that you or your loved one might have an infection. Some infections can be treated with antibiotics, which are powerful, life-saving drugs. Like all medications, antibiotics have side effects and should only be used when necessary. There are some important things you should know about your antibiotic treatment.      Your healthcare team may run tests before you start taking an antibiotic.    Your team may take samples (e.g., from your blood, urine or other areas) to run tests to look for bacteria. These test can be important to determine if you need an antibiotic at all and, if you do, which antibiotic will work best.      Within a few days, your healthcare team might change or even stop your antibiotic.    Your team may start you on an antibiotic while they are working to find out what is making you sick.    Your team might change your antibiotic because test results show that a different antibiotic would be better to treat your infection.    In some cases, once your team has more information, they learn that you do not need an antibiotic at all. They may find out that you don't have an infection, or that the antibiotic you're taking won't work against your infection. For example, an infection caused by a virus can't be treated with antibiotics. Staying on an antibiotic when you don't need it is more likely to be harmful than helpful.      You may experience side effects from your antibiotic.    Like all medications, antibiotics have side effects. Some of these can be serious.    Let you healthcare team know if you have any known allergies when you are admitted to the hospital.    One significant side effect of nearly all antibiotics is the risk of severe and sometimes deadly diarrhea caused by Clostridium difficile (C. Difficile). This occurs when a person takes antibiotics because some good germs are destroyed. Antibiotic use allows C. diificile to take over, putting patients at high risk  for this serious infection.    As a patient or caregiver, it is important to understand your or your loved one's antibiotic treatment. It is especially important for caregivers to speak up when patients can't speak for themselves. Here are some important questions to ask your healthcare team.    What infection is this antibiotic treating and how do you know I have that infection?    What side effects might occur from this antibiotic?    How long will I need to take this antibiotic?    Is it safe to take this antibiotic with other medications or supplements (e.g., vitamins) that I am taking?     Are there any special directions I need to know about taking this antibiotic? For example, should I take it with food?    How will I be monitored to know whether my infection is responding to the antibiotic?    What tests may help to make sure the right antibiotic is prescribed for me?      Information provided by:  www.cdc.gov/getsmart  U.S. Department of Health and Human Services  Centers for disease Control and Prevention  National Center for Emerging and Zoonotic Infectious Diseases  Division of Healthcare Quality Promotion             Medication List: This is a list of all your medications and when to take them. Check marks below indicate your daily home schedule. Keep this list as a reference.      Medications           Morning Afternoon Evening Bedtime As Needed    albuterol 108 (90 Base) MCG/ACT Inhaler   Commonly known as:  PROAIR HFA/PROVENTIL HFA/VENTOLIN HFA   Inhale 1-2 puffs into the lungs every 6 hours as needed for shortness of breath / dyspnea or wheezing                                ALENDRONATE SODIUM PO   Take 70 mg by mouth once a week                                cholestyramine 4 g Packet   Commonly known as:  QUESTRAN   Take 1 packet (4 g) by mouth 2 times daily for 10 days                                LISINOPRIL PO   Take 20 mg by mouth daily   Last time this was given:  20 mg on 6/10/2018  7:39  AM                                mometasone-formoterol 100-5 MCG/ACT oral inhaler   Commonly known as:  DULERA   Inhale 2 puffs into the lungs daily                                vancomycin 50 MG/ML Soln   Take 2.5 mLs (125 mg) by mouth 4 times daily for 11 days   Last time this was given:  125 mg on 6/10/2018  7:39 AM

## 2018-06-07 ENCOUNTER — APPOINTMENT (OUTPATIENT)
Dept: PHYSICAL THERAPY | Facility: CLINIC | Age: 78
DRG: 872 | End: 2018-06-07
Attending: INTERNAL MEDICINE
Payer: MEDICARE

## 2018-06-07 LAB
ALBUMIN SERPL-MCNC: 2.8 G/DL (ref 3.4–5)
ALP SERPL-CCNC: 75 U/L (ref 40–150)
ALT SERPL W P-5'-P-CCNC: 32 U/L (ref 0–70)
ANION GAP SERPL CALCULATED.3IONS-SCNC: 9 MMOL/L (ref 3–14)
AST SERPL W P-5'-P-CCNC: 30 U/L (ref 0–45)
BACTERIA SPEC CULT: NORMAL
BILIRUB DIRECT SERPL-MCNC: 0.4 MG/DL (ref 0–0.2)
BILIRUB SERPL-MCNC: 1.2 MG/DL (ref 0.2–1.3)
BUN SERPL-MCNC: 16 MG/DL (ref 7–30)
C COLI+JEJUNI+LARI FUSA STL QL NAA+PROBE: NOT DETECTED
C DIFF TOX B STL QL: POSITIVE
CALCIUM SERPL-MCNC: 7.5 MG/DL (ref 8.5–10.1)
CHLORIDE SERPL-SCNC: 109 MMOL/L (ref 94–109)
CO2 SERPL-SCNC: 22 MMOL/L (ref 20–32)
CREAT SERPL-MCNC: 1.39 MG/DL (ref 0.66–1.25)
EC STX1 GENE STL QL NAA+PROBE: NOT DETECTED
EC STX2 GENE STL QL NAA+PROBE: NOT DETECTED
ENTERIC PATHOGEN COMMENT: NORMAL
ERYTHROCYTE [DISTWIDTH] IN BLOOD BY AUTOMATED COUNT: 14.3 % (ref 10–15)
GFR SERPL CREATININE-BSD FRML MDRD: 49 ML/MIN/1.7M2
GLUCOSE SERPL-MCNC: 102 MG/DL (ref 70–99)
HCT VFR BLD AUTO: 40 % (ref 40–53)
HGB BLD-MCNC: 13.2 G/DL (ref 13.3–17.7)
INTERPRETATION ECG - MUSE: NORMAL
MCH RBC QN AUTO: 29.3 PG (ref 26.5–33)
MCHC RBC AUTO-ENTMCNC: 33 G/DL (ref 31.5–36.5)
MCV RBC AUTO: 89 FL (ref 78–100)
NOROV GI+II ORF1-ORF2 JNC STL QL NAA+PR: NOT DETECTED
PLATELET # BLD AUTO: 137 10E9/L (ref 150–450)
POTASSIUM SERPL-SCNC: 4.1 MMOL/L (ref 3.4–5.3)
PROCALCITONIN SERPL-MCNC: 0.79 NG/ML
PROT SERPL-MCNC: 6.1 G/DL (ref 6.8–8.8)
RBC # BLD AUTO: 4.5 10E12/L (ref 4.4–5.9)
RVA NSP5 STL QL NAA+PROBE: NOT DETECTED
SALMONELLA SP RPOD STL QL NAA+PROBE: NOT DETECTED
SHIGELLA SP+EIEC IPAH STL QL NAA+PROBE: NOT DETECTED
SODIUM SERPL-SCNC: 140 MMOL/L (ref 133–144)
SPECIMEN SOURCE: ABNORMAL
SPECIMEN SOURCE: NORMAL
V CHOL+PARA RFBL+TRKH+TNAA STL QL NAA+PR: NOT DETECTED
WBC # BLD AUTO: 6.2 10E9/L (ref 4–11)
Y ENTERO RECN STL QL NAA+PROBE: NOT DETECTED

## 2018-06-07 PROCEDURE — 97116 GAIT TRAINING THERAPY: CPT | Mod: GP | Performed by: PHYSICAL THERAPIST

## 2018-06-07 PROCEDURE — 84145 PROCALCITONIN (PCT): CPT | Performed by: INTERNAL MEDICINE

## 2018-06-07 PROCEDURE — 87040 BLOOD CULTURE FOR BACTERIA: CPT | Performed by: INTERNAL MEDICINE

## 2018-06-07 PROCEDURE — 80076 HEPATIC FUNCTION PANEL: CPT | Performed by: INTERNAL MEDICINE

## 2018-06-07 PROCEDURE — 85027 COMPLETE CBC AUTOMATED: CPT | Performed by: INTERNAL MEDICINE

## 2018-06-07 PROCEDURE — 40000193 ZZH STATISTIC PT WARD VISIT: Performed by: PHYSICAL THERAPIST

## 2018-06-07 PROCEDURE — 36415 COLL VENOUS BLD VENIPUNCTURE: CPT | Performed by: INTERNAL MEDICINE

## 2018-06-07 PROCEDURE — 99232 SBSQ HOSP IP/OBS MODERATE 35: CPT | Performed by: INTERNAL MEDICINE

## 2018-06-07 PROCEDURE — 12000000 ZZH R&B MED SURG/OB

## 2018-06-07 PROCEDURE — 97110 THERAPEUTIC EXERCISES: CPT | Mod: GP | Performed by: PHYSICAL THERAPIST

## 2018-06-07 PROCEDURE — 80048 BASIC METABOLIC PNL TOTAL CA: CPT | Performed by: INTERNAL MEDICINE

## 2018-06-07 PROCEDURE — 25000128 H RX IP 250 OP 636: Performed by: INTERNAL MEDICINE

## 2018-06-07 PROCEDURE — 25000125 ZZHC RX 250: Performed by: INTERNAL MEDICINE

## 2018-06-07 PROCEDURE — A9270 NON-COVERED ITEM OR SERVICE: HCPCS | Mod: GY | Performed by: INTERNAL MEDICINE

## 2018-06-07 PROCEDURE — 87506 IADNA-DNA/RNA PROBE TQ 6-11: CPT | Performed by: INTERNAL MEDICINE

## 2018-06-07 PROCEDURE — 97162 PT EVAL MOD COMPLEX 30 MIN: CPT | Mod: GP | Performed by: PHYSICAL THERAPIST

## 2018-06-07 PROCEDURE — 87493 C DIFF AMPLIFIED PROBE: CPT | Performed by: INTERNAL MEDICINE

## 2018-06-07 PROCEDURE — 25000132 ZZH RX MED GY IP 250 OP 250 PS 637: Mod: GY | Performed by: INTERNAL MEDICINE

## 2018-06-07 PROCEDURE — 97530 THERAPEUTIC ACTIVITIES: CPT | Mod: GP | Performed by: PHYSICAL THERAPIST

## 2018-06-07 RX ORDER — LIDOCAINE 40 MG/G
CREAM TOPICAL
Status: CANCELLED | OUTPATIENT
Start: 2018-06-07

## 2018-06-07 RX ORDER — PIPERACILLIN SODIUM, TAZOBACTAM SODIUM 4; .5 G/20ML; G/20ML
4.5 INJECTION, POWDER, LYOPHILIZED, FOR SOLUTION INTRAVENOUS EVERY 6 HOURS
Status: DISCONTINUED | OUTPATIENT
Start: 2018-06-07 | End: 2018-06-07

## 2018-06-07 RX ADMIN — LISINOPRIL 20 MG: 20 TABLET ORAL at 08:06

## 2018-06-07 RX ADMIN — FLUTICASONE FUROATE AND VILANTEROL TRIFENATATE 1 PUFF: 100; 25 POWDER RESPIRATORY (INHALATION) at 21:53

## 2018-06-07 RX ADMIN — Medication 125 MG: at 17:25

## 2018-06-07 RX ADMIN — HEPARIN SODIUM 5000 UNITS: 5000 INJECTION, SOLUTION INTRAVENOUS; SUBCUTANEOUS at 21:53

## 2018-06-07 RX ADMIN — Medication 125 MG: at 21:53

## 2018-06-07 RX ADMIN — PIPERACILLIN SODIUM,TAZOBACTAM SODIUM 4.5 G: 4; .5 INJECTION, POWDER, FOR SOLUTION INTRAVENOUS at 14:19

## 2018-06-07 RX ADMIN — SODIUM CHLORIDE: 9 INJECTION, SOLUTION INTRAVENOUS at 11:06

## 2018-06-07 RX ADMIN — HEPARIN SODIUM 5000 UNITS: 5000 INJECTION, SOLUTION INTRAVENOUS; SUBCUTANEOUS at 08:06

## 2018-06-07 RX ADMIN — ACETAMINOPHEN 650 MG: 325 TABLET, FILM COATED ORAL at 04:18

## 2018-06-07 RX ADMIN — PIPERACILLIN SODIUM,TAZOBACTAM SODIUM 3.38 G: 3; .375 INJECTION, POWDER, FOR SOLUTION INTRAVENOUS at 08:05

## 2018-06-07 RX ADMIN — ONDANSETRON 4 MG: 4 TABLET, ORALLY DISINTEGRATING ORAL at 11:15

## 2018-06-07 RX ADMIN — PIPERACILLIN SODIUM,TAZOBACTAM SODIUM 3.38 G: 3; .375 INJECTION, POWDER, FOR SOLUTION INTRAVENOUS at 02:16

## 2018-06-07 NOTE — CONSULTS
[facility]  Gastroenterology Consultation         Jamie Urban  [address]     [facility] [address]  77 year old male    Admission Date/Time: 6/6/2018  Primary Care Provider: See Huffman  Referring / Attending Physician:  DR. Tapia    We were asked to see the patient in consultation by Dr. Tapia for evaluation of Diarrhea      CC: Diarrhea    HPI:  Jamie rUban is a 77 year old male who was admitted due to history of diarrhea fever significant weakness patient has past history significant for hypertension lupus dyslipidemia COPD sleep apnea osteoporosis hypothyroidism with recent episode of diverticulitis about 2 weeks ago which was documented on CT patient was started on antibiotics including Cipro and Flagyl.  After finishing the course of his antibiotic patient developed significant symptoms of weakness some abdominal discomfort and loose stools.  Patient was brought to the emergency with above-mentioned symptoms and history of low-grade fever in the ER patient's temperature was 102.6 patient denied any  any other significant GI complaints family reports patient was diagnosed with Lyme disease 2 years ago patient was treated with antibiotics patient has been having these weakness symptoms off and on patient is fairly poor historian most of the information was obtained from reviewing the chart.      ROS: A comprehensive ten point review of systems was negative aside from those in mentioned in the HPI.      PAST MED HX:  I have reviewed this patient's medical history and updated it with pertinent information if needed.   Past Medical History:   Diagnosis Date     Hyperparathyroidism (H)        MEDICATIONS:   Prior to Admission Medications   Prescriptions Last Dose Informant Patient Reported? Taking?   ALENDRONATE SODIUM PO 6/5/2018 at Unknown time  Yes Yes   Sig: Take 70 mg by mouth once a week   LISINOPRIL PO 6/6/2018 at am  Yes Yes   Sig: Take 20 mg by mouth daily    albuterol (PROAIR HFA/PROVENTIL HFA/VENTOLIN HFA) 108 (90 Base) MCG/ACT Inhaler Past Week at Unknown time  Yes Yes   Sig: Inhale 1-2 puffs into the lungs every 6 hours as needed for shortness of breath / dyspnea or wheezing   mometasone-formoterol (DULERA) 100-5 MCG/ACT oral inhaler 6/6/2018 at am  Yes Yes   Sig: Inhale 2 puffs into the lungs daily       Facility-Administered Medications: None       ALLERGIES:   Allergies   Allergen Reactions     Sulfa Drugs      Varenicline      Other reaction(s): Hallucinations     Bupropion Anxiety     Other reaction(s): Tremors  Patient unaware       SOCIAL HISTORY:  Social History   Substance Use Topics     Smoking status: Former Smoker     Smokeless tobacco: Not on file     Alcohol use Yes      Comment: rare       FAMILY HISTORY:  No family history on file.    PHYSICAL EXAM:   General awake alert very pleasant.  Vital Signs with Ranges  Temp: 100.5  F (38.1  C) Temp src: Oral BP: 137/64   Heart Rate: 91 Resp: 20 SpO2: 96 % O2 Device: None (Room air)    I/O last 3 completed shifts:  In: -   Out: 200 [Urine:200]    Constitutional: healthy, alert and no distress   Cardiovascular: negative, PMI normal. No lifts, heaves, or thrills. RRR. No murmurs, clicks gallops or rub  Respiratory: negative, Percussion normal. Good diaphragmatic excursion. Lungs clear  Head: Normocephalic. No masses, lesions, tenderness or abnormalities  Neck: Neck supple. No adenopathy. Thyroid symmetric, normal size,, Carotids without bruits.  Abdomen: Abdomen soft, non-tender. BS normal. No masses, organomegaly  SKIN: no suspicious lesions or rashes          ADDITIONAL COMMENTS:   I reviewed the patient's new clinical lab test results.   Recent Labs   Lab Test  06/07/18   0707  06/06/18   1550  02/22/14   0205   WBC  6.2  6.2  7.9   HGB  13.2*  15.1  16.1   MCV  89  87  85   PLT  137*  186  150     Recent Labs   Lab Test  06/07/18   0707  06/06/18   1550  02/22/14   0205   POTASSIUM  4.1  4.3  4.0    CHLORIDE  109  107  110*   CO2  22  24  24   BUN  16  14  22   ANIONGAP  9  7  7     Recent Labs   Lab Test  06/07/18   0707  06/06/18   1627  06/06/18   1550  02/22/14   0205   ALBUMIN  2.8*   --   3.6  4.5   BILITOTAL  1.2   --   1.0  1.1   ALT  32   --   45  82*   AST  30   --   46*  59*   PROTEIN   --   Negative   --    --    LIPASE   --    --    --   195       I reviewed the patient's new imaging results.        CONSULTATION ASSESSMENT AND PLAN:    Active Problems:    SIRS (systemic inflammatory response syndrome) (H)    Assessment: Very pleasant 77-year-old gentleman with history of multiple chronic health problem with history of diverticulitis course of antibiotics with recurrent symptoms of fever weakness and loose stools.  Patient currently does not have any findings  Of active diverticulitis.  Patient's current fever is of unclear etiology.  Giving of history of loose stools I will recommend the patient to be evaluated with stool studies again to check for C. difficile.  CAT scan of the abdomen this time showed thickening of cecum and proximal ascending colon suggestive of acute infectious or inflammatory process involving the right colon.  If patient's C. difficile is negative then I will recommend him to be evaluated further with colonoscopy to rule out persistent diverticulitis or other infectious process.  Plan was discussed in detail with patient patient was reassured I will advise the patient to be started on liquid diet today.  Possible colonoscopy tomorrow if C. difficile for stool is negative.  Thank you very much for letting me participate in his care.               Yassine Diaz MD, FACP  [facility].  Office: [phone]  Cell : [phone]

## 2018-06-07 NOTE — PLAN OF CARE
Problem: Patient Care Overview  Goal: Plan of Care/Patient Progress Review  Outcome: Improving  A/O, [place].  tmax 101.7, ibuprofen administered x1 (tylenol already given in ED).  Needed Ax2 in ED, but has not gotten OOB yet on the unit.  IV infusing in left AC NS 75ml/hr.  Continue to monitor.

## 2018-06-07 NOTE — PROVIDER NOTIFICATION
Temp 102.2 oral - released conditional order for blood cultures x2 and paged hospitalist to notify.

## 2018-06-07 NOTE — PROGRESS NOTES
RECEIVING UNIT ED HANDOFF REVIEW    ED Nurse Handoff Report was reviewed by: Faye Schaefer on June 6, 2018 at 8:23 PM

## 2018-06-07 NOTE — PROGRESS NOTES
[facility]    Hospitalist Progress Note    Assessment & Plan   Mr. Jamie Urban is a pleasant 77-year-old gentleman with past medical history of hypertension, dyslipidemia, chronic kidney disease stage 3, COPD, obstructive sleep apnea, hyperparathyroidism, history of Lyme disease a couple of years ago, and recent diagnosis of diverticulitis who came in for evaluation of fevers and generalized weakness.   PLAN:   1.  Concern for early sepsis of unclear etiology at this time.  He came in with generalized weakness and fevers.  He had a documented fever in the ER of 102.4.  Upon arrival, he was tachycardic, heart rate in 130s, and he was having chills.  The etiology of sepsis at this time is not clear. Lactic acid is normal and he does not have elevated white blood cells.  His UA is negative.  Chest x-ray does not show any infiltrates.  He was even tested for influenza, which was negative.  He did have an episode of diverticulitis apparently a couple of weeks ago and he was treated with 10 days of oral ciprofloxacin and Flagyl as an outpatient.  CAT scan of the abdomen was done given his recent history of diverticulitis and it showed some mild wall thickening of the cecum and proximal ascending colon that is suggestive of infectious or inflammatory colitis.2 years ago he was diagnosed with Lyme disease and he did have kind of a similar presentation with generalized weakness at that time.  [facility] for [facility].  The wife did not notice any tick bites.  - continue  on Zosyn.    -procalcitonin pending .    - monitor cultures.   -Lyme disease antibody IgG and IgM are pending at this time.  -will request GI input considering ongoing concern of diverticulitis   -labs in am    - PT, OT evaluation has been ordered.   2.  Hypertension.    - lisinopril  with holding parameters.   3.  Chronic obstructive pulmonary disease.   - prior to admission inhalers  -DuoNebs  p.r.n.   4.  Chronic kidney disease stage 3.   - creatinine varies between 1.3-1.4  -  Will closely monitor his kidney function.   5.  Deep venous thrombosis prophylaxis:  on heparin subcutaneously twice daily.   6.  CODE STATUS: DNR/DNI.    7.cdiff colitis    -started on vancomycin oral   DISPOSITION: possibly 1-2 days more   Griselda Shirley MD  Text Page   (7am to 6pm)    Interval History    he is feeling better than yesterday but not back to baseline, had loose stools today, plan for culture, no abdominal pain, no nausea, he has history of  Prior tick borne illness, he thinks it was lymes but not sure, no fever today.    -Data reviewed today: I reviewed all new labs and imaging results over the last 24 hours. I personally reviewed -CT abdomen, discussed renal calculi and abdominal aortic aneurysm with patient , he is already aware of it from recent CT outpatient and has follow up arranged.    dPhysical Exam   Temp: 99.2  F (37.3  C) Temp src: Oral BP: 116/63   Heart Rate: 98 Resp: 18 SpO2: 95 % O2 Device: None (Room air)    Vitals:    06/06/18 1534 06/07/18 0517   Weight: 99.8 kg (220 lb) 104.1 kg (229 lb 9.6 oz)     Vital Signs with Ranges  Temp:  [99.2  F (37.3  C)-102.4  F (39.1  C)] 99.2  F (37.3  C)  Heart Rate:  [] 98  Resp:  [12-26] 18  BP: (101-143)/(56-97) 116/63  SpO2:  [92 %-96 %] 95 %  I/O last 3 completed shifts:  In: -   Out: 200 [Urine:200]    Constitutional: awake, alert, cooperative, no apparent distress  Respiratory: Clear to auscultation bilaterally, no crackles or wheezing  Cardiovascular: Regular rate and rhythm, normal S1 and S2, and no murmur noted  GI: Normal bowel sounds, soft, non-distended, non-tender  Skin/Integumen: No rashes, no cyanosis, no edema  Neuro : moving all 4 extremities, no focal deficit noted     Medications     sodium chloride 75 mL/hr at 06/06/18 2113       fluticasone-vilanterol  1 puff Inhalation QPM     heparin  5,000 Units Subcutaneous Q12H     lisinopril  (PRINIVIL/ZESTRIL) tablet 20 mg  20 mg Oral Daily     piperacillin-tazobactam  3.375 g Intravenous Q6H     sodium chloride (PF)  3 mL Intracatheter Q8H       Data     Recent Labs  Lab 06/07/18  0707 06/06/18  1550   WBC  --  6.2   HGB  --  15.1   MCV  --  87   PLT  --  186    138   POTASSIUM 4.1 4.3   CHLORIDE 109 107   CO2 22 24   BUN 16 14   CR 1.39* 1.34*   ANIONGAP 9 7   IRAIS 7.5* 8.5   * 100*   ALBUMIN 2.8* 3.6   PROTTOTAL 6.1* 7.8   BILITOTAL 1.2 1.0   ALKPHOS 75 102   ALT 32 45   AST 30 46*   TROPI  --  <0.015       Recent Labs  Lab 06/07/18  0707 06/06/18  1550   * 100*       Imaging:   Recent Results (from the past 24 hour(s))   CT Abdomen Pelvis w Contrast    Narrative    CT ABDOMEN AND PELVIS WITH CONTRAST   6/6/2018 4:53 PM     HISTORY: Recently diagnosed diverticulitis. Now with mild left lower  quadrant tenderness.    COMPARISON: 2/22/2014.    TECHNIQUE: Following the uneventful administration of 111mL Isovue-370  intravenous contrast, helical sections were acquired from the top of  the diaphragm through the pubic symphysis. Coronal reconstructions  were generated. Radiation dose for this scan was reduced using  automated exposure control, adjustment of the mA and/or kV according  to the patient's size, or iterative reconstruction technique.    FINDINGS:     Abdomen: The liver is unremarkable. Mild splenomegaly. The spleen  measures 13.9 cm in craniocaudal dimension. A few punctate  calcifications seen in the pancreas may relate to chronic  pancreatitis. The adrenal glands are unremarkable. Two small cysts in  the right kidney. A few subcentimeter low-attenuation lesions in both  kidneys, too small to characterize. Two nonobstructing calculi in the  inferior pole of the left kidney, the largest measuring 0.5 cm. The  gallbladder is present. No enlarged lymph nodes or free fluid in the  upper abdomen. Atherosclerotic calcification in the abdominal aorta.  Mild aneurysmal dilatation of  the distal abdominal aorta which  measures 3.6 x 3.2 cm in axial dimensions. 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 Marked elevation  of the right hemidiaphragm.    Scan through the lower chest is significant for atelectasis in the  right lung base. Coronary artery calcification.    Pelvis: The small and large bowel are normal in caliber. Several  colonic diverticula are present without convincing evidence of  diverticulitis. Apparent mild wall thickening of the cecum and  proximal ascending colon. Slight haziness is present in the fat about  the thick-walled colon. The appendix is not visualized. No pneumatosis  or free intraperitoneal gas. No enlarged lymph nodes or free fluid in  the pelvis. Small right inguinal hernia containing fat.      Impression    IMPRESSION:   1. Apparent mild wall thickening of the cecum and proximal ascending  colon. This is suggestive of infectious or inflammatory colitis.  Recommend correlation with the patient's symptoms.  2. No other cause of acute pain identified in the abdomen or pelvis.  3. Colonic diverticulosis without convincing evidence of  diverticulitis.  4. Mild splenomegaly.  5. Two nonobstructing left renal calculi.  6. 3.6 cm distal abdominal aortic aneurysm.    BRENDA MONTERO MD   XR Chest 2 Views    Narrative    CHEST TWO VIEWS   6/6/2018 5:00 PM     HISTORY: Fever, SIRS, evaluate pneumonia.     COMPARISON: None.    FINDINGS: Elevated right hemidiaphragm. Chest otherwise negative.      Impression    IMPRESSION: Elevated right hemidiaphragm. Nothing acute.    MYLES MARSHALL MD

## 2018-06-07 NOTE — H&P
Admitted:     06/06/2018      DATE OF ADMISSION: 06/06/2018      PRIMARY CARE PHYSICIAN:  See Graham MD      CHIEF COMPLAINT:  Fever and generalized weakness.      HISTORY OF PRESENT ILLNESS:  History of present illness has been obtained from the patient who is a relatively good historian, although he is hard of hearing.  His wife and daughter were present at the time of my examination and provided helpful information.  I also discussed with ER attending.       Mr. Jamie Urban is a very pleasant 77-year-old gentleman with past medical history of hypertension, dyslipidemia, obstructive sleep apnea, COPD, osteoporosis, hyperparathyroidism and recent episode of diverticulitis who came in for evaluation of fever and generalized weakness.  Apparently the patient had been diagnosed with diverticulitis a couple of weeks ago when he was having some pain in left lower quadrant.  Apparently he had a CT of the abdomen that showed diverticulitis. He was put on oral ciprofloxacin and Flagyl for a total of 10 days.  He reports that he did take these medications, although he felt it was a longer course and abdominal pain eventually resolved.  Then he was in his usual state of health until last night when he started feeling weak.  He was very vague if he was having abdominal pain or not.  Initially he said he does not have any abdominal pain, later on he reported some abdominal cramps. The patient reported some nausea today and poor appetite.  He reported that he ate only half of a bowl of cereal today because he was feeling full.  He continued to have generalized weakness today to the point that his wife had to help him stand up from a chair, which is unusual for him.  He also started having fever today with a temperature at home of 102.6.  He stated that his last bowel movement was yesterday and it was soft.  The patient denies any vomiting.  He does have some chronic shortness of breath due to his history of COPD, but did not  get worse recently.  He denies any coughing, no cold symptoms, no chest pain, no diarrhea, no leg swelling, no rashes.  He reports that at home, he just had a fever and he started having chills after he arrived in the ER.      The family reports that he was diagnosed with Lyme disease 2 years ago.  He was in [place] at that time.  His symptoms at that time were generalized weakness and confusion.  Apparently he had been on 3 weeks of doxycycline and his symptoms resolved.  He stated that he did go to his cottage in [facility] [place] last week where he stayed for a few days.  The wife did not notice any tick bites.  The family feels that his symptoms might be somehow related to those when he was diagnosed with Lyme disease.      In the ER, he was seen by Dr. Haley. His initial vitals showed blood pressure of 137/97.  He was tachycardic, heart rate in 130s, respiratory rate 18, oxygen saturation 92% on room air.  He was febrile to 102.4 in the ER.  He did have basic blood work checked which showed a BMP with mildly elevated creatinine at 1.32, otherwise unremarkable liver function tests with mildly elevated AST 46, normal ALT, normal total bilirubin.  Troponin was negative.  Lactate was 1.6.  His CBC with no leukocytosis.  His UA was negative.  Chest x-ray was done which did not show any evidence of infiltrates.  He was tested for influenza which was negative.  Blood cultures drawn in the ER are pending at this time.  He also had a CT of the abdomen and pelvis with IV contrast which showed apparent mild wall thickening of the cecum and proximal ascending colon that is suggestive of infectious or inflammatory colitis.  No other cause of acute pain identified in the abdomen or pelvis.  There is some chronic diverticulosis without evidence of diverticulitis, mild splenomegaly, and a 3.6 cm distal abdominal aortic aneurysm.  In the ER, he received a liter of IV normal saline, 1 dose of Tylenol 1000 mg p.o. and 1 dose  [facility] for possible sepsis of unclear etiology.      PAST MEDICAL HISTORY:   1.  Hypertension.   2.  Dyslipidemia.   3.  Chronic obstructive pulmonary disease.   4.  Obstructive sleep apnea, using CPAP at night.   5.  History of Lyme disease.   6.  Hyperparathyroidism.   7.  Osteoporosis.   8.  Recent history of diverticulitis for which he had been on Cipro and Flagyl apparently for 10 days.      PAST SURGICAL HISTORY:   1.  Parathyroid surgery.   2.  Lithotripsy.   3.  Vasectomy.      SOCIAL HISTORY:  He is a former smoker.  He quit smoking 3-4 years ago.  He denies alcohol drinking and he denies illicit drug abuse.      FAMILY HISTORY:  Reviewed with the patient. It seems that his mother had hypertension.  His father had heart problems.  He had 2 sisters, 1 sister  because of complications of flu.  He has 2 biological children and 2 adopted children, apparently in good state of health.      MEDICATIONS PRIOR TO ADMISSION:   1.  Albuterol inhaler 1-2 puffs into the lungs every 6 hours p.r.n.   2.  Lisinopril 20 mg p.o. daily.   3.  Alendronate 70 mg p.o. once a week.   4.  Dulera 2 puffs into the lungs daily.      ALLERGIES:   Allergies   Allergen Reactions     Sulfa Drugs      Varenicline      Other reaction(s): Hallucinations     Bupropion Anxiety     Other reaction(s): Tremors  Patient unaware        REVIEW OF SYSTEMS:  The 10-point review of systems was conducted and it was negative except for pertinent positives mentioned in history of present illness.      PHYSICAL EXAMINATION:   VITAL SIGNS:  His blood pressure was 137/97.  He was tachycardic with heart rate in 130s initially. This had improved to 100s at the time of my examination. He had fever in ER of 102.4, had recurrent fever after he arrived on the floor of 101.7.  Respiratory rate 18, oxygen saturation 93%-94% on room air.   GENERAL:  The patient is awake, alert, no acute distress at the time of my examination. He sounds a little bit wheezy.    HEENT:  Normocephalic, atraumatic.  Pupils are equal, round and reactive to light.  Oral mucosa is mildly dry.   NECK:  Supple.  No cervical lymphadenopathy, no thyromegaly.   CHEST:  There is bilateral air entry with scattered wheezing on auscultation.  No rales, no crackles.   CARDIOVASCULAR:  There is normal S1 and S2, mild tachycardia, no murmurs, no rubs.   ABDOMEN:  Soft, obese, mildly distended.  Mildly tender to palpation in right lower quadrant, although not clear.  Bowel sounds are present.   EXTREMITIES:  No leg swelling, no calf tenderness, 2+ peripheral pulses are palpable.   SKIN:  Intact, no rashes, no cyanosis.   NEUROLOGIC:  The patient is awake, alert, oriented to self, place and time.  There are no focal neurological deficits.   PSYCHIATRIC:  Normal mood, normal affect.   MUSCULOSKELETAL:  Moves all extremities freely.  There are no obvious joint deformities.      LABORATORY DATA:  BMP with sodium 138, potassium 4.3, chloride 107, bicarbonate 27, BUN 14, creatinine 1.34, anion gap of 7.  Albumin is 3.6, total protein 7.8, total bilirubin 1, alkaline phosphatase 102, ALT 45, AST 46.  Lactate 1.6.  Troponin less than 0.015.  Glucose 100.  CBC with white blood cells 6.1, hemoglobin 15.1, hematocrit 44.6, platelet count 106,000.  UA is negative.  Because of fevers of unclear etiology, he was checked for influenza, which returned back negative.      IMAGING:  Chest x-ray did not show any infiltrates.        CT of the abdomen and pelvis showed mild wall thickening of the cecum and proximal ascending colon that is suggestive of possible infectious or inflammatory colitis.  There is no evidence of diverticulitis. There are 2 nonobstructing left renal calculi and 3.6 cm distal abdominal aortic aneurysm.      ASSESSMENT:  Mr. Jamie Urban is a pleasant 77-year-old gentleman with past medical history of hypertension, dyslipidemia, chronic kidney disease stage 3, COPD, obstructive sleep apnea,  hyperparathyroidism, history of Lyme disease a couple of years ago, and recent diagnosis of diverticulitis who came in for evaluation of fevers and generalized weakness.      PLAN:   1.  Concern for early sepsis of unclear etiology at this time.  He came in with generalized weakness and fevers.  He had a documented fever in the ER of 102.4.  Upon arrival, he was tachycardic, heart rate in 130s, and he was having chills.  The etiology of sepsis at this time is not clear. Lactic acid is normal and he does not have elevated white blood cells.  His UA is negative.  Chest x-ray does not show any infiltrates.  He was even tested for influenza, which was negative.  He did have an episode of diverticulitis apparently a couple of weeks ago and he was treated with 10 days of oral ciprofloxacin and Flagyl as an outpatient.  He does not report too much abdominal pain, although he did seem to have some mild abdominal pain on palpation. His abdomen seems to be mildly distended.  CAT scan of the abdomen was done given his recent history of diverticulitis and it showed some mild wall thickening of the cecum and proximal ascending colon that is suggestive of infectious or inflammatory colitis. I am not sure if these findings are correlating with his presenting symptoms since his right side of the abdomen is not really tender.  There is no obvious rash, no neck stiffness, no headaches.  Family reports that 2 years ago he was diagnosed with Lyme disease and he did have kind of a similar presentation with generalized weakness at that time.  He did go to his [facility] last week where he stayed for a few days.  The wife did not notice any tick bites.  Because the source of sepsis is not clear at this time and because of recent diagnosis of diverticulitis and CAT scan findings, we chose to start him empirically on Zosyn.  We are going to check a procalcitonin.  Will monitor fever curve and white blood cells trend. Will monitor cultures.  I  did put a conditional order to repeat blood cultures if he continues to spike a fever more than 101 degrees Fahrenheit.  Lyme disease antibody IgG and IgM are pending at this time.  If he will continue to have fever of unclear etiology, may consider ID consult.  PT, OT evaluation has been ordered.   2.  Hypertension.  His blood pressure seems to be reasonably controlled at this time.  Will continue his prior to admission lisinopril for now with holding parameters.   3.  Chronic obstructive pulmonary disease.  He does seem to have some scattered wheezing.  He is not hypoxic, though.  He does have some chronic shortness of breath that is not reportedly getting worse recently. Will continue his prior to admission inhalers and I did order DuoNebs p.r.n.   4.  Chronic kidney disease stage 3.  His creatinine today is 1.34, which seems to be around his baseline. In Care Everywhere, his creatinine varies between 1.3-1.4.  I did start him on mild IV hydration for now.  Will closely monitor his kidney function.   5.  Deep venous thrombosis prophylaxis:  I have started him on heparin subcutaneously twice daily.   6.  CODE STATUS: DISCUSSED WITH THE PATIENT AND HIS WIFE, PATIENT WISHES TO BE DNR/DNI.      DISPOSITION:  Admit to inpatient.  I anticipate at least 2 days of hospitalization.         NEVA KONG MD             D: 2018   T: 2018   MT:       Name:     CHAN FALCON   MRN:      [id number]        Account:      [id number]   :      1940        Admitted:     2018                   Document: [id number]

## 2018-06-07 NOTE — PLAN OF CARE
Problem: Patient Care Overview  Goal: Plan of Care/Patient Progress Review  A&Ox4 pt is [place] but has hearing aids, TMAX 100.5 other VSS on RA. Tele: SR w/ BBB. 5 loose stools during this shift, c diff+. A1 walks to the bathroom.

## 2018-06-07 NOTE — CONSULTS
Acute Colitis and diarrhea.  Await C-diff results if negative than colonoscopy tomorrow.    Full consult addicted    Yassine Diaz MD

## 2018-06-07 NOTE — PHARMACY-ADMISSION MEDICATION HISTORY
Admission medication history interview status for the 6/6/2018  admission is complete. See EPIC admission navigator for prior to admission medications     Medication history source reliability:Good- patient     Actions taken by pharmacist (provider contacted, etc): Reviewed what patient reported with information in CareEverywhere     Additional medication history information not noted on PTA med list :None    Medication reconciliation/reorder completed by provider prior to medication history? No    Time spent in this activity: 10 min    Prior to Admission medications    Medication Sig Last Dose Taking? Auth Provider   albuterol (PROAIR HFA/PROVENTIL HFA/VENTOLIN HFA) 108 (90 Base) MCG/ACT Inhaler Inhale 1-2 puffs into the lungs every 6 hours as needed for shortness of breath / dyspnea or wheezing Past Week at Unknown time Yes Unknown, Entered By History   ALENDRONATE SODIUM PO Take 70 mg by mouth once a week 6/5/2018 at Unknown time Yes Unknown, Entered By History   LISINOPRIL PO Take 20 mg by mouth daily 6/6/2018 at am Yes Unknown, Entered By History   mometasone-formoterol (DULERA) 100-5 MCG/ACT oral inhaler Inhale 2 puffs into the lungs daily  6/6/2018 at am Yes Unknown, Entered By History

## 2018-06-07 NOTE — PLAN OF CARE
Problem: Patient Care Overview  Goal: Plan of Care/Patient Progress Review  PT eval completed and treatment initiated,  Pt admitted for weakness and fever with concern for early sepsis.   Pt's previous level of function was I at condo with spouse without use of AD, no stairs, I with ADL's.     Discharge Planner PT   Patient plan for discharge: home with spouse  Current status: min A for bed mob, transfers with FWW and min A to CGA for amb with ', miguel LE ex in sitting with rest breaks.  Barriers to return to prior living situation: Min A for all mob, limited miguel for funcitonal mob, decreased balance with increased fall risk requiring AD  Recommendations for discharge: Anticipate with LOS pt will progress enough to meet goal and be able to dc to home with spouse if goals are met.  If pt not progressing well may need TCU to return to I prior to dc to home.  Rationale for recommendations: cont therapies to improve strength, balance and act miguel to progress functional mob I, miguel and safety       Entered by: CHAN GARCIA 06/07/2018 2:28 PM

## 2018-06-07 NOTE — PROGRESS NOTES
06/07/18 1400   Quick Adds   Type of Visit Initial PT Evaluation   Living Environment   Lives With spouse   Living Arrangements condominium   Home Accessibility no concerns   Living Environment Comment walk in shower   Self-Care   Usual Activity Tolerance moderate   Current Activity Tolerance fair   Regular Exercise no   Equipment Currently Used at Home none   Functional Level Prior   Ambulation 0-->independent   Transferring 0-->independent   Toileting 0-->independent   Bathing 0-->independent   Dressing 0-->independent   Eating 0-->independent   Communication 0-->understands/communicates without difficulty   Swallowing 0-->swallows foods/liquids without difficulty   Cognition 0 - no cognition issues reported   Fall history within last six months no   Which of the above functional risks had a recent onset or change? ambulation;transferring   General Information   Onset of Illness/Injury or Date of Surgery - Date 06/06/18   Referring Physician Xuan Tapia   Patient/Family Goals Statement pt would like to dc to home with spouse   Pertinent History of Current Problem (include personal factors and/or comorbidities that impact the POC) Mr. Jamie Urban is a pleasant 77-year-old gentleman with past medical history of hypertension, dyslipidemia, chronic kidney disease stage 3, COPD, obstructive sleep apnea, hyperparathyroidism, history of Lyme disease a couple of years ago, and recent diagnosis of diverticulitis who came in for evaluation of fevers and generalized weakness.    Precautions/Limitations fall precautions   Weight-Bearing Status - LUE full weight-bearing   Weight-Bearing Status - RUE full weight-bearing   Weight-Bearing Status - LLE full weight-bearing   Weight-Bearing Status - RLE full weight-bearing   General Observations spouse present and supportive   Cognitive Status Examination   Orientation person;place   Level of Consciousness alert   Follows Commands and Answers Questions 100% of the time  "  Personal Safety and Judgment intact   Cognitive Comment Pt stating he is having difficulty keeping track of time of day   Pain Assessment   Patient Currently in Pain No   Range of Motion (ROM)   ROM Comment decreased hip abd/add, and flexion noted with ex in sitting   Strength   Strength Comments decreased strength in B LE, R>L per pt with knee issues, able to lift antigraviity but needing A with bed mob   Bed Mobility   Bed Mobility Comments bed mob with min A   Transfer Skills   Transfer Comments sit to stand with min A and FWW and cues for safety   Gait   Gait Comments amb with FWW wiht CGA 20'   Balance   Balance Comments impaired standing dynamic balance requires B UE support to maintain   Coordination   Coordination no deficits were identified   General Therapy Interventions   Planned Therapy Interventions bed mobility training;gait training;strengthening;ROM;stretching;transfer training;balance training   Clinical Impression   Criteria for Skilled Therapeutic Intervention yes, treatment indicated   PT Diagnosis difficulty transferring and amb   Influenced by the following impairments decreased strength, balance, ROM and act miguel, some cog concerns, cont diarrhea, fatigue   Functional limitations due to impairments impaired functional mob I and safety   Clinical Presentation Evolving/Changing   Clinical Presentation Rationale 3-5 deficits   Clinical Decision Making (Complexity) Moderate complexity   Therapy Frequency` daily   Predicted Duration of Therapy Intervention (days/wks) 5 days   Anticipated Equipment Needs at Discharge front wheeled walker   Anticipated Discharge Disposition Home with Assist;Home with Home Therapy   Risk & Benefits of therapy have been explained Yes   Patient, Family & other staff in agreement with plan of care Yes   [facility] AM-PAC TM \"6 Clicks\"   2016, Trustees of [facility], under license to [employer].  All rights reserved.   6 Clicks Short Forms Basic Mobility " "Inpatient Short Form   [facility] AM-PAC  \"6 Clicks\" V.2 Basic Mobility Inpatient Short Form   1. Turning from your back to your side while in a flat bed without using bedrails? 3 - A Little   2. Moving from lying on your back to sitting on the side of a flat bed without using bedrails? 3 - A Little   3. Moving to and from a bed to a chair (including a wheelchair)? 3 - A Little   4. Standing up from a chair using your arms (e.g., wheelchair, or bedside chair)? 3 - A Little   5. To walk in hospital room? 3 - A Little   6. Climbing 3-5 steps with a railing? 2 - A Lot   Basic Mobility Raw Score (Score out of 24.Lower scores equate to lower levels of function) 17   Total Evaluation Time   Total Evaluation Time (Minutes) 13     "

## 2018-06-07 NOTE — PLAN OF CARE
Problem: Patient Care Overview  Goal: Plan of Care/Patient Progress Review  Outcome: No Change  Pt is A&Ox4. Lummi, hearing aid at bedside. VSS , tmax 100.5 this shift, PRN tylenol given. Tele- SR with BBB. Up A1-2. Regular diet. Urinary frequency, voids small amounts at a time, using urinal. L PIV infusing NS @75. Plans for discharge pending. Will continue to monitor.

## 2018-06-08 ENCOUNTER — APPOINTMENT (OUTPATIENT)
Dept: PHYSICAL THERAPY | Facility: CLINIC | Age: 78
DRG: 872 | End: 2018-06-08
Payer: MEDICARE

## 2018-06-08 LAB
ANION GAP SERPL CALCULATED.3IONS-SCNC: 7 MMOL/L (ref 3–14)
B BURGDOR IGG SER QL IB: NEGATIVE
B BURGDOR IGM SER QL IB: POSITIVE
BUN SERPL-MCNC: 16 MG/DL (ref 7–30)
CALCIUM SERPL-MCNC: 7.5 MG/DL (ref 8.5–10.1)
CHLORIDE SERPL-SCNC: 108 MMOL/L (ref 94–109)
CO2 SERPL-SCNC: 23 MMOL/L (ref 20–32)
CREAT SERPL-MCNC: 1.62 MG/DL (ref 0.66–1.25)
ERYTHROCYTE [DISTWIDTH] IN BLOOD BY AUTOMATED COUNT: 14.7 % (ref 10–15)
GFR SERPL CREATININE-BSD FRML MDRD: 41 ML/MIN/1.7M2
GLUCOSE SERPL-MCNC: 84 MG/DL (ref 70–99)
HCT VFR BLD AUTO: 37.9 % (ref 40–53)
HGB BLD-MCNC: 12.2 G/DL (ref 13.3–17.7)
MCH RBC QN AUTO: 28.7 PG (ref 26.5–33)
MCHC RBC AUTO-ENTMCNC: 32.2 G/DL (ref 31.5–36.5)
MCV RBC AUTO: 89 FL (ref 78–100)
PLATELET # BLD AUTO: 149 10E9/L (ref 150–450)
POTASSIUM SERPL-SCNC: 4.1 MMOL/L (ref 3.4–5.3)
RBC # BLD AUTO: 4.25 10E12/L (ref 4.4–5.9)
SODIUM SERPL-SCNC: 138 MMOL/L (ref 133–144)
WBC # BLD AUTO: 5 10E9/L (ref 4–11)

## 2018-06-08 PROCEDURE — 25000128 H RX IP 250 OP 636: Performed by: INTERNAL MEDICINE

## 2018-06-08 PROCEDURE — 97116 GAIT TRAINING THERAPY: CPT | Mod: GP

## 2018-06-08 PROCEDURE — 80048 BASIC METABOLIC PNL TOTAL CA: CPT | Performed by: INTERNAL MEDICINE

## 2018-06-08 PROCEDURE — 25000132 ZZH RX MED GY IP 250 OP 250 PS 637: Mod: GY | Performed by: INTERNAL MEDICINE

## 2018-06-08 PROCEDURE — 85027 COMPLETE CBC AUTOMATED: CPT | Performed by: INTERNAL MEDICINE

## 2018-06-08 PROCEDURE — 40000894 ZZH STATISTIC OT IP EVAL DEFER

## 2018-06-08 PROCEDURE — 99232 SBSQ HOSP IP/OBS MODERATE 35: CPT | Performed by: INTERNAL MEDICINE

## 2018-06-08 PROCEDURE — 36415 COLL VENOUS BLD VENIPUNCTURE: CPT | Performed by: INTERNAL MEDICINE

## 2018-06-08 PROCEDURE — 40000193 ZZH STATISTIC PT WARD VISIT

## 2018-06-08 PROCEDURE — A9270 NON-COVERED ITEM OR SERVICE: HCPCS | Mod: GY | Performed by: INTERNAL MEDICINE

## 2018-06-08 PROCEDURE — 97530 THERAPEUTIC ACTIVITIES: CPT | Mod: GP

## 2018-06-08 PROCEDURE — 12000000 ZZH R&B MED SURG/OB

## 2018-06-08 RX ADMIN — Medication 125 MG: at 16:51

## 2018-06-08 RX ADMIN — LISINOPRIL 20 MG: 20 TABLET ORAL at 08:30

## 2018-06-08 RX ADMIN — Medication 125 MG: at 21:06

## 2018-06-08 RX ADMIN — FLUTICASONE FUROATE AND VILANTEROL TRIFENATATE 1 PUFF: 100; 25 POWDER RESPIRATORY (INHALATION) at 19:57

## 2018-06-08 RX ADMIN — HEPARIN SODIUM 5000 UNITS: 5000 INJECTION, SOLUTION INTRAVENOUS; SUBCUTANEOUS at 19:57

## 2018-06-08 RX ADMIN — Medication 125 MG: at 08:30

## 2018-06-08 RX ADMIN — Medication 125 MG: at 12:49

## 2018-06-08 RX ADMIN — HEPARIN SODIUM 5000 UNITS: 5000 INJECTION, SOLUTION INTRAVENOUS; SUBCUTANEOUS at 08:30

## 2018-06-08 RX ADMIN — SODIUM CHLORIDE: 9 INJECTION, SOLUTION INTRAVENOUS at 01:14

## 2018-06-08 RX ADMIN — SODIUM CHLORIDE: 9 INJECTION, SOLUTION INTRAVENOUS at 14:14

## 2018-06-08 NOTE — PLAN OF CARE
Problem: Patient Care Overview  Goal: Plan of Care/Patient Progress Review  Discharge Planner PT   Patient plan for discharge: Home with assist  Current status: Pt progressing well towards goals. Pt is Mod I for bed mobility. SBA STS without AD. Pt ambulated 300' progressing to no UE support, CGA, engaged in head turns with no LOB. Pt engaged in seated exercises and provided with written HEP  Barriers to return to prior living situation: Medical stability  Recommendations for discharge: home with assist  Rationale for recommendations: Pt safe to DC home with assist and continued HEP        Entered by: Krystal Doherty 06/08/2018 4:30 PM

## 2018-06-08 NOTE — PLAN OF CARE
Problem: Patient Care Overview  Goal: Plan of Care/Patient Progress Review  Outcome: No Change  [id number]: A/O. Temp 102.2. MD notified and blood cultures x2 drawn. Tele sinus tach with BBB. PO vanco started for pos c-diff. 3 watery loose brown/yellow stools this evening. IVF 75 cc/hr. Clear liquid diet. Up with walker and SBA. Generalized weakness.

## 2018-06-08 NOTE — PLAN OF CARE
Problem: Patient Care Overview  Goal: Plan of Care/Patient Progress Review  Discharge Planner OT   Patient plan for discharge: home w/spouse  Current status: OT orders received, chart reviewed and per conversation with nursing and patient he is back to baseline performance with self-cares, cognition and mobility and feel no OT intervention indicated. Pt up with SBA in room, performing toileting SBA, no further confusion noted. Family present and plan is for return home w/spouse at dc. Therefore OT to sign off and complete order.  Barriers to return to prior living situation: see PT note  Recommendations for discharge: see PT note  Rationale for recommendations: see PT note       Entered by: Prashanth Urias 06/08/2018 3:22 PM

## 2018-06-08 NOTE — PLAN OF CARE
Problem: Patient Care Overview  Goal: Plan of Care/Patient Progress Review  Outcome: No Change  A&O. VSS, afebrile. Up with SBA, more steady on feet today. 3 loose stools this morning, very small, enteric precautions for c-diff. Vancomycin continued. Ambulated around unit x1. Plan to dc possibly tomorrow or when stools lessen to 2-3 per day. New PIV placed due to infiltration. Tele SR with BBB.

## 2018-06-08 NOTE — PLAN OF CARE
Problem: Patient Care Overview  Goal: Plan of Care/Patient Progress Review  Outcome: No Change  VSS, low grade temp.  A&O.  Tele: SR, tachy at times with BBB.  Up with SBA and walker.  Denies pain.  Enteric precaution, 2 loose stools this shift, no appetite.  On vanco oral solution.  PIV infusing.  [place].  Denies pain.  Will continue to monitor.

## 2018-06-08 NOTE — PLAN OF CARE
Problem: Patient Care Overview  Goal: Plan of Care/Patient Progress Review  Outcome: No Change  Patient is A&Ox4. VSS ex Tmax 100.7, declines interventions. [place]. Telemetry read SR w/ BBB. Regular diet but poor appetite. Multiple loose stools overnight d/t positive cdiff. Up SBA with walker to bathroom, calls appropriately. L PIV infusing NS at 75 cc/hr.

## 2018-06-09 ENCOUNTER — APPOINTMENT (OUTPATIENT)
Dept: PHYSICAL THERAPY | Facility: CLINIC | Age: 78
DRG: 872 | End: 2018-06-09
Payer: MEDICARE

## 2018-06-09 LAB — PLATELET # BLD AUTO: 134 10E9/L (ref 150–450)

## 2018-06-09 PROCEDURE — 25000128 H RX IP 250 OP 636: Performed by: INTERNAL MEDICINE

## 2018-06-09 PROCEDURE — 97530 THERAPEUTIC ACTIVITIES: CPT | Mod: GP

## 2018-06-09 PROCEDURE — 25000132 ZZH RX MED GY IP 250 OP 250 PS 637: Mod: GY | Performed by: INTERNAL MEDICINE

## 2018-06-09 PROCEDURE — A9270 NON-COVERED ITEM OR SERVICE: HCPCS | Mod: GY | Performed by: INTERNAL MEDICINE

## 2018-06-09 PROCEDURE — 36415 COLL VENOUS BLD VENIPUNCTURE: CPT | Performed by: INTERNAL MEDICINE

## 2018-06-09 PROCEDURE — 40000193 ZZH STATISTIC PT WARD VISIT

## 2018-06-09 PROCEDURE — 12000000 ZZH R&B MED SURG/OB

## 2018-06-09 PROCEDURE — 85049 AUTOMATED PLATELET COUNT: CPT | Performed by: INTERNAL MEDICINE

## 2018-06-09 PROCEDURE — 99232 SBSQ HOSP IP/OBS MODERATE 35: CPT | Performed by: INTERNAL MEDICINE

## 2018-06-09 RX ADMIN — Medication 125 MG: at 07:53

## 2018-06-09 RX ADMIN — Medication 125 MG: at 21:06

## 2018-06-09 RX ADMIN — Medication 125 MG: at 17:43

## 2018-06-09 RX ADMIN — SODIUM CHLORIDE: 9 INJECTION, SOLUTION INTRAVENOUS at 12:04

## 2018-06-09 RX ADMIN — SODIUM CHLORIDE: 9 INJECTION, SOLUTION INTRAVENOUS at 00:16

## 2018-06-09 RX ADMIN — HEPARIN SODIUM 5000 UNITS: 5000 INJECTION, SOLUTION INTRAVENOUS; SUBCUTANEOUS at 07:54

## 2018-06-09 RX ADMIN — FLUTICASONE FUROATE AND VILANTEROL TRIFENATATE 1 PUFF: 100; 25 POWDER RESPIRATORY (INHALATION) at 21:06

## 2018-06-09 RX ADMIN — LISINOPRIL 20 MG: 20 TABLET ORAL at 07:52

## 2018-06-09 RX ADMIN — Medication 125 MG: at 12:04

## 2018-06-09 RX ADMIN — HEPARIN SODIUM 5000 UNITS: 5000 INJECTION, SOLUTION INTRAVENOUS; SUBCUTANEOUS at 21:06

## 2018-06-09 NOTE — PLAN OF CARE
Problem: Infection, Risk/Actual (Adult)  Goal: Identify Related Risk Factors and Signs and Symptoms  Related risk factors and signs and symptoms are identified upon initiation of Human Response Clinical Practice Guideline (CPG).   Outcome: Improving  Pt just starting to get appetite back, eating/drinking fair.  Tele SR with BBB.  IVFs infusing.  Pt up in room on own, calls for help if needed.  Continues with multiple small stools.

## 2018-06-09 NOTE — PLAN OF CARE
Problem: Patient Care Overview  Goal: Plan of Care/Patient Progress Review  Outcome: Improving  A/ox4. VSS on RA. Denies pain. Tele SR w/ BBB. 2 small, loose stools this evening. Ambulated in hallway  this afternoon with PT with SBA. BLE edema +1. Enteric precautions maintained for C-diff, PO Vancomycin continued. R PIV infusing NS at 75mL/hr. Using home CPAP at bedtime. Plan for discharge possibly tomorrow, patient to have only 2-3 loose stools prior to d/c.  Continue to monitor.

## 2018-06-09 NOTE — PLAN OF CARE
Problem: Patient Care Overview  Goal: Plan of Care/Patient Progress Review  Outcome: No Change  Patient is A&Ox4. VSS. Denies pain. Telemetry read SR with BBB. Loose stools overnight. Up with SBA and IV pole. BLE edema. C-diff positive, on PO vanco. R PIV infusing NS at 75 cc/hr. Possible discharge 6/9.

## 2018-06-09 NOTE — PLAN OF CARE
Problem: Patient Care Overview  Goal: Plan of Care/Patient Progress Review  Discharge Planner PT   Patient plan for discharge: Home with family  Current status: Focus on functional ambulation w/o AD, progressed to SBA. No LOB. Pt tolerates ~300' at this time prior to requiring rest break. Pt encouraged to ambulate with nursing staff and continue HEP  Barriers to return to prior living situation: None  Recommendations for discharge: HOme with assist when medically stable  Rationale for recommendations: continue walking program and HEP upon DC to home         Entered by: Krystal Doherty 06/09/2018 2:41 PM

## 2018-06-09 NOTE — PROGRESS NOTES
[facility]  Gastroenterology Progress Note     Jamie Urban MRN# [id number]   YOB: 1940 Age: 77 year old          Assessment and Plan:     SIRS (systemic inflammatory response syndrome) (H)  C. difficile colitis currently doing better on vancomycin patient was started yesterday patient's diarrhea is already improving.  Patient is overall feeling well denying any new GI complaints.  I will recommend the patient to continue on his vancomycin 4 times a day for next 2 weeks patient is stable from GI standpoint.  Patient can be discharged from GI standpoint on vancomycin when ready from hospitalist service.  Follow-up in GI clinic in 1 week.       Generalized weakness      Interval History:   doing well; no cp, sob, n/v/d, or abd pain.              Review of Systems:   C: NEGATIVE for fever, chills, change in weight  E/M: NEGATIVE for ear, mouth and throat problems  R: NEGATIVE for significant cough or SOB  CV: NEGATIVE for chest pain, palpitations or peripheral edema             Medications:   I have reviewed this patient's current medications    fluticasone-vilanterol  1 puff Inhalation QPM     heparin  5,000 Units Subcutaneous Q12H     lisinopril (PRINIVIL/ZESTRIL) tablet 20 mg  20 mg Oral Daily     sodium chloride (PF)  3 mL Intracatheter Q8H     vancomycin  125 mg Oral 4x Daily                  Physical Exam:   Vitals were reviewed  Vital Signs with Ranges  Temp:  [98.3  F (36.8  C)-98.6  F (37  C)] 98.3  F (36.8  C)  Heart Rate:  [74-93] 87  Resp:  [16-18] 18  BP: (120-148)/(70-78) 148/78  SpO2:  [94 %-96 %] 96 %  I/O last 3 completed shifts:  In: 2564 [P.O.:340; I.V.:2224]  Out: -   Cardiovascular: negative, PMI normal. No lifts, heaves, or thrills. RRR. No murmurs, clicks gallops or rub  Respiratory: negative, Percussion normal. Good diaphragmatic excursion. Lungs clear  Neck: Neck supple. No adenopathy. Thyroid symmetric, normal size,, Carotids without bruits.  Abdomen: Abdomen  soft, non-tender. BS normal. No masses, organomegaly  SKIN: no suspicious lesions or rashes           Data:   I reviewed the patient's new clinical lab test results.   Recent Labs   Lab Test  06/09/18   0643  06/08/18   0648  06/07/18   0707  06/06/18   1550   WBC   --   5.0  6.2  6.2   HGB   --   12.2*  13.2*  15.1   MCV   --   89  89  87   PLT  134*  149*  137*  186     Recent Labs   Lab Test  06/08/18   0648  06/07/18   0707  06/06/18   1550   POTASSIUM  4.1  4.1  4.3   CHLORIDE  108  109  107   CO2  23  22  24   BUN  16  16  14   ANIONGAP  7  9  7     Recent Labs   Lab Test  06/07/18   0707  06/06/18   1627  06/06/18   1550  02/22/14   0205   ALBUMIN  2.8*   --   3.6  4.5   BILITOTAL  1.2   --   1.0  1.1   ALT  32   --   45  82*   AST  30   --   46*  59*   PROTEIN   --   Negative   --    --    LIPASE   --    --    --   195       I reviewed the patient's new imaging results.    All laboratory data reviewed  All imaging studies reviewed by me.    Yassine Diaz MD,  6/9/2018  [facility]  Office : [phone]  Cell: [phone]

## 2018-06-10 VITALS
OXYGEN SATURATION: 95 % | DIASTOLIC BLOOD PRESSURE: 86 MMHG | SYSTOLIC BLOOD PRESSURE: 143 MMHG | RESPIRATION RATE: 18 BRPM | TEMPERATURE: 97.4 F | BODY MASS INDEX: 34.19 KG/M2 | WEIGHT: 225.6 LBS | HEIGHT: 68 IN

## 2018-06-10 LAB
ANION GAP SERPL CALCULATED.3IONS-SCNC: 8 MMOL/L (ref 3–14)
BUN SERPL-MCNC: 13 MG/DL (ref 7–30)
CALCIUM SERPL-MCNC: 8 MG/DL (ref 8.5–10.1)
CHLORIDE SERPL-SCNC: 112 MMOL/L (ref 94–109)
CO2 SERPL-SCNC: 21 MMOL/L (ref 20–32)
CREAT SERPL-MCNC: 1.03 MG/DL (ref 0.66–1.25)
GFR SERPL CREATININE-BSD FRML MDRD: 70 ML/MIN/1.7M2
GLUCOSE SERPL-MCNC: 83 MG/DL (ref 70–99)
POTASSIUM SERPL-SCNC: 3.7 MMOL/L (ref 3.4–5.3)
SODIUM SERPL-SCNC: 141 MMOL/L (ref 133–144)

## 2018-06-10 PROCEDURE — 99238 HOSP IP/OBS DSCHRG MGMT 30/<: CPT | Performed by: INTERNAL MEDICINE

## 2018-06-10 PROCEDURE — 36415 COLL VENOUS BLD VENIPUNCTURE: CPT | Performed by: INTERNAL MEDICINE

## 2018-06-10 PROCEDURE — 25000132 ZZH RX MED GY IP 250 OP 250 PS 637: Mod: GY | Performed by: INTERNAL MEDICINE

## 2018-06-10 PROCEDURE — 25000128 H RX IP 250 OP 636: Performed by: INTERNAL MEDICINE

## 2018-06-10 PROCEDURE — 40000275 ZZH STATISTIC RCP TIME EA 10 MIN

## 2018-06-10 PROCEDURE — 94660 CPAP INITIATION&MGMT: CPT

## 2018-06-10 PROCEDURE — A9270 NON-COVERED ITEM OR SERVICE: HCPCS | Mod: GY | Performed by: INTERNAL MEDICINE

## 2018-06-10 PROCEDURE — 80048 BASIC METABOLIC PNL TOTAL CA: CPT | Performed by: INTERNAL MEDICINE

## 2018-06-10 RX ORDER — CHOLESTYRAMINE 4 G/9G
1 POWDER, FOR SUSPENSION ORAL 2 TIMES DAILY
Qty: 90 EACH | Refills: 0 | Status: SHIPPED | OUTPATIENT
Start: 2018-06-10 | End: 2018-06-20

## 2018-06-10 RX ORDER — CHOLESTYRAMINE 4 G/9G
1 POWDER, FOR SUSPENSION ORAL 2 TIMES DAILY
Status: DISCONTINUED | OUTPATIENT
Start: 2018-06-10 | End: 2018-06-10 | Stop reason: HOSPADM

## 2018-06-10 RX ADMIN — Medication 125 MG: at 13:58

## 2018-06-10 RX ADMIN — SODIUM CHLORIDE: 9 INJECTION, SOLUTION INTRAVENOUS at 00:52

## 2018-06-10 RX ADMIN — HEPARIN SODIUM 5000 UNITS: 5000 INJECTION, SOLUTION INTRAVENOUS; SUBCUTANEOUS at 07:40

## 2018-06-10 RX ADMIN — Medication 125 MG: at 07:39

## 2018-06-10 RX ADMIN — LISINOPRIL 20 MG: 20 TABLET ORAL at 07:39

## 2018-06-10 NOTE — PROGRESS NOTES
[facility]  Gastroenterology Progress Note     Jamie Urban MRN# [id number]   YOB: 1940 Age: 77 year old          Assessment and Plan:     SIRS (systemic inflammatory response syndrome) (H)  C. difficile colitis currently doing better patient is still having looser stools patient had 4 bowel movements yesterday patient had one this morning patient is denying any other significant complaint patient is feeling more energetic and overall better.  Patient is denying any nausea or vomiting or other side effects.  Patient feels his appetite is improving.  I will recommend the patient to continue on vancomycin for total 2 weeks.  I will start him on cholestyramine 4 g packet twice a day.  Patient is clinically stable patient can be discharged home from GI standpoint.  I will recommend him to have follow-up visit next week in GI clinic.  If patient diarrhea does not resolve by then I will recommend him to be evaluated with colonoscopy and biopsies.  Plan was discussed in detail with patient.         Generalized weakness      Interval History:   doing well; no cp, sob, n/v/d, or abd pain.              Review of Systems:   C: NEGATIVE for fever, chills, change in weight  E/M: NEGATIVE for ear, mouth and throat problems  R: NEGATIVE for significant cough or SOB  CV: NEGATIVE for chest pain, palpitations or peripheral edema             Medications:   I have reviewed this patient's current medications    cholestyramine  1 packet Oral BID     fluticasone-vilanterol  1 puff Inhalation QPM     heparin  5,000 Units Subcutaneous Q12H     lisinopril (PRINIVIL/ZESTRIL) tablet 20 mg  20 mg Oral Daily     sodium chloride (PF)  3 mL Intracatheter Q8H     vancomycin  125 mg Oral 4x Daily                  Physical Exam:   Vitals were reviewed  Vital Signs with Ranges  Temp:  [97.4  F (36.3  C)-97.8  F (36.6  C)] 97.4  F (36.3  C)  Heart Rate:  [76-89] 77  Resp:  [16-18] 18  BP: (135-157)/(74-91)  143/86  SpO2:  [94 %-96 %] 95 %  I/O last 3 completed shifts:  In: 2359 [P.O.:640; I.V.:1719]  Out: -   Constitutional: healthy, alert and no distress   Cardiovascular: negative, PMI normal. No lifts, heaves, or thrills. RRR. No murmurs, clicks gallops or rub  Respiratory: negative, Percussion normal. Good diaphragmatic excursion. Lungs clear  Head: Normocephalic. No masses, lesions, tenderness or abnormalities  Neck: Neck supple. No adenopathy. Thyroid symmetric, normal size,, Carotids without bruits.  Abdomen: Abdomen soft, non-tender. BS normal. No masses, organomegaly  SKIN: no suspicious lesions or rashes           Data:   I reviewed the patient's new clinical lab test results.   Recent Labs   Lab Test  06/09/18   0643  06/08/18   0648  06/07/18   0707  06/06/18   1550   WBC   --   5.0  6.2  6.2   HGB   --   12.2*  13.2*  15.1   MCV   --   89  89  87   PLT  134*  149*  137*  186     Recent Labs   Lab Test  06/10/18   0810  06/08/18   0648  06/07/18   0707   POTASSIUM  3.7  4.1  4.1   CHLORIDE  112*  108  109   CO2  21  23  22   BUN  13  16  16   ANIONGAP  8  7  9     Recent Labs   Lab Test  06/07/18   0707  06/06/18   1627  06/06/18   1550  02/22/14   0205   ALBUMIN  2.8*   --   3.6  4.5   BILITOTAL  1.2   --   1.0  1.1   ALT  32   --   45  82*   AST  30   --   46*  59*   PROTEIN   --   Negative   --    --    LIPASE   --    --    --   195       I reviewed the patient's new imaging results.    All laboratory data reviewed  All imaging studies reviewed by me.    Yassine Diaz MD,  6/10/2018  [facility]  Office : [phone]  Cell: [phone]

## 2018-06-10 NOTE — DISCHARGE SUMMARY
[facility]    Discharge Summary  Hospitalist    Date of Admission:  6/6/2018  Date of Discharge:  6/10/2018  2:24 PM  Discharging Provider: Xuna Tapia  Date of Service (when I saw the patient): 06/10/18    Discharge Diagnoses   C Diff colitis    Chronic and stable medical problems:  HTN  COPD  HLP  CKD stage 3  DOMINIQUE    History of Present Illness     Mr. Jamie Urban is a pleasant 77-year-old gentleman with PMH of hypertension, dyslipidemia, CKD stage 3, COPD, DOMINIQUE, hyperparathyroidism, history of Lyme disease a couple of years ago, and recent diagnosis of diverticulitis who came in for evaluation of fevers and generalized weakness; he was found to have C diff colitis.; for a detailed HPI- please refer to H&P done by myself on 06/06/2018.     Hospital Course   Jamie Urban was admitted on 6/6/2018.  The following problems were addressed during his hospitalization:    1. C diff colitis  - he presented with fever and generalized weakness; initially started empirically on Zosyn as source of infection was not clear; there was no leucocytosis, LA 1.6, UA negative, CXR- no infiltrates, Influenza A and B and RSV negative  - CT abdomen/pelvis- showed apparent mild wall thickening of the cecum and proximal ascending colon that is suggestive of infectious or inflammatory colitis  - he was on Cipro/Flagyl recently for diagnosis of diverticulitis  - GI consulted  - C diff toxin return back positive; he started having diarrhea; he was started on po Vanco on 06/07  - doing better, afebrile now, still with some diarrhea at the time of the discharge  - GI recommended to start Cholestyramine BID and continue with Vanco po for a total of 2 weeks  - the patient should follow up with GI next week; if he will continue to have diarrhea- GI may consider colonoscopy as outpatient     2.  Hypertension.    - continue PTA lisinopril 20 mg po daily with holding parameters.      3.  COPD  - not acute issue  - continue  PTA Dulera     4.  CKD stage 3.   - creatinine varies between 1.3-1.4  - cr here 1.34--1.39--1.62; started on mild iv hydration  - cr improved to 1.03 at the time of the discharge     5. DOMINIQUE  - using CPAP at bedtime     6. Positive Lyme disease IgM  - was sent initially from ER given unclear sepsis source  - IgM by immunoblot returned back positive; I think that this is related to his prior h/o Lyme disease 2 years ago; he does not have any rash, no neurological or cardiac symptoms suggestive of Lyme disease and he got better with po Vanco; discussed with ID who agreed with above     7. Generalized weakness  - PT rec d/c home with assist     # Discharge Pain Plan:   - Patient currently has NO PAIN and is not being prescribed pain medications on discharge.    Xuan Tapia    Significant Results and Procedures   See below.    Pending Results   These results will be followed up by PMD  Unresulted Labs Ordered in the Past 30 Days of this Admission     Date and Time Order Name Status Description    6/7/2018 1626 Blood culture Preliminary     6/7/2018 1626 Blood culture Preliminary     6/6/2018 1611 Blood culture ONE site Preliminary     6/6/2018 1556 Blood culture Preliminary           Code Status   DNR / DNI       Primary Care Physician   See Huffman    Physical Exam   Temp: 97.4  F (36.3  C) Temp src: Oral BP: 143/86   Heart Rate: 77 Resp: 18 SpO2: 95 % O2 Device: None (Room air)    Vitals:    06/07/18 0517 06/08/18 0113 06/09/18 0009   Weight: 104.1 kg (229 lb 9.6 oz) 102.2 kg (225 lb 6.4 oz) 102.3 kg (225 lb 9.6 oz)     Vital Signs with Ranges  Temp:  [97.4  F (36.3  C)-97.8  F (36.6  C)] 97.4  F (36.3  C)  Heart Rate:  [77-89] 77  Resp:  [18] 18  BP: (143-157)/(81-91) 143/86  SpO2:  [94 %-96 %] 95 %  I/O last 3 completed shifts:  In: 2359 [P.O.:640; I.V.:1719]  Out: -     Constitutional:  awake, alert, cooperative, no apparent distress  Respiratory: Clear to auscultation bilaterally, no crackles or  wheezing  Cardiovascular: Regular rate and rhythm, normal S1 and S2, and no murmur noted  GI: Normal bowel sounds, soft, obese, non-tender  Skin/Integumen: No rashes, no cyanosis, trace edema  Neuro : moving all 4 extremities, no focal deficit noted        Discharge Disposition   Discharged to home  Condition at discharge: Good    Consultations This Hospital Stay   OCCUPATIONAL THERAPY ADULT IP CONSULT  PHYSICAL THERAPY ADULT IP CONSULT  GASTROENTEROLOGY IP CONSULT    Time Spent on this Encounter   IXuan, personally saw the patient today and spent less than or equal to 30 minutes discharging this patient.    Discharge Orders     Reason for your hospital stay   C diff colitis     Follow-up and recommended labs and tests    Follow up with primary care provider, See Huffman, within 7 days for hospital follow- up.  No follow up labs or test are needed.    Follow up with GI clinic, Dr Diaz within 1 week.     Full Code     Diet   Follow this diet upon discharge: Orders Placed This Encounter     Low Saturated Fat Na <2400 mg       Discharge Medications   Current Discharge Medication List      START taking these medications    Details   cholestyramine (QUESTRAN) 4 g Packet Take 1 packet (4 g) by mouth 2 times daily for 10 days  Qty: 90 each, Refills: 0    Associated Diagnoses: C. difficile colitis      vancomycin (FIRST) 50 MG/ML SOLN Take 2.5 mLs (125 mg) by mouth 4 times daily for 11 days  Qty: 110 mL, Refills: 0    Associated Diagnoses: C. difficile colitis         CONTINUE these medications which have NOT CHANGED    Details   albuterol (PROAIR HFA/PROVENTIL HFA/VENTOLIN HFA) 108 (90 Base) MCG/ACT Inhaler Inhale 1-2 puffs into the lungs every 6 hours as needed for shortness of breath / dyspnea or wheezing      ALENDRONATE SODIUM PO Take 70 mg by mouth once a week      LISINOPRIL PO Take 20 mg by mouth daily      mometasone-formoterol (DULERA) 100-5 MCG/ACT oral inhaler Inhale 2 puffs into the lungs  daily            Allergies   Allergies   Allergen Reactions     Sulfa Drugs      Varenicline      Other reaction(s): Hallucinations     Bupropion Anxiety     Other reaction(s): Tremors  Patient unaware     Data   Most Recent 3 CBC's:  Recent Labs   Lab Test  06/09/18   0643  06/08/18   0648  06/07/18   0707  06/06/18   1550   WBC   --   5.0  6.2  6.2   HGB   --   12.2*  13.2*  15.1   MCV   --   89  89  87   PLT  134*  149*  137*  186      Most Recent 3 BMP's:  Recent Labs   Lab Test  06/10/18   0810  06/08/18   0648  06/07/18   0707   NA  141  138  140   POTASSIUM  3.7  4.1  4.1   CHLORIDE  112*  108  109   CO2  21  23  22   BUN  13  16  16   CR  1.03  1.62*  1.39*   ANIONGAP  8  7  9   IRAIS  8.0*  7.5*  7.5*   GLC  83  84  102*     Most Recent 2 LFT's:  Recent Labs   Lab Test  06/07/18   0707  06/06/18   1550   AST  30  46*   ALT  32  45   ALKPHOS  75  102   BILITOTAL  1.2  1.0     Most Recent INR's and Anticoagulation Dosing History:  Anticoagulation Dose History     There is no flowsheet data to display.        Most Recent 3 Troponin's:  Recent Labs   Lab Test  06/06/18   1550   TROPI  <0.015     Most Recent Cholesterol Panel:No lab results found.  Most Recent 6 Bacteria Isolates From Any Culture (See EPIC Reports for Culture Details):  Recent Labs   Lab Test  06/07/18   1755  06/07/18   1710  06/06/18   1616  06/06/18   1550   CULT  Canceled, Test credited  Duplicate request    No growth after 3 days  No growth after 3 days  No growth after 4 days  No growth after 4 days     Most Recent TSH, T4 and A1c Labs:No lab results found.  Results for orders placed or performed during the hospital encounter of 06/06/18   XR Chest 2 Views    Narrative    CHEST TWO VIEWS   6/6/2018 5:00 PM     HISTORY: Fever, SIRS, evaluate pneumonia.     COMPARISON: None.    FINDINGS: Elevated right hemidiaphragm. Chest otherwise negative.      Impression    IMPRESSION: Elevated right hemidiaphragm. Nothing acute.    MYLES MARSHALL MD    CT Abdomen Pelvis w Contrast    Narrative    CT ABDOMEN AND PELVIS WITH CONTRAST   6/6/2018 4:53 PM     HISTORY: Recently diagnosed diverticulitis. Now with mild left lower  quadrant tenderness.    COMPARISON: 2/22/2014.    TECHNIQUE: Following the uneventful administration of 111mL Isovue-370  intravenous contrast, helical sections were acquired from the top of  the diaphragm through the pubic symphysis. Coronal reconstructions  were generated. Radiation dose for this scan was reduced using  automated exposure control, adjustment of the mA and/or kV according  to the patient's size, or iterative reconstruction technique.    FINDINGS:     Abdomen: The liver is unremarkable. Mild splenomegaly. The spleen  measures 13.9 cm in craniocaudal dimension. A few punctate  calcifications seen in the pancreas may relate to chronic  pancreatitis. The adrenal glands are unremarkable. Two small cysts in  the right kidney. A few subcentimeter low-attenuation lesions in both  kidneys, too small to characterize. Two nonobstructing calculi in the  inferior pole of the left kidney, the largest measuring 0.5 cm. The  gallbladder is present. No enlarged lymph nodes or free fluid in the  upper abdomen. Atherosclerotic calcification in the abdominal aorta.  Mild aneurysmal dilatation of the distal abdominal aorta which  measures 3.6 x 3.2 cm in axial dimensions. 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 Marked elevation  of the right hemidiaphragm.    Scan through the lower chest is significant for atelectasis in the  right lung base. Coronary artery calcification.    Pelvis: The small and large bowel are normal in caliber. Several  colonic diverticula are present without convincing evidence of  diverticulitis. Apparent mild wall thickening of the cecum and  proximal ascending colon. Slight haziness is present in the fat about  the thick-walled colon. The appendix is not visualized. No  pneumatosis  or free intraperitoneal gas. No enlarged lymph nodes or free fluid in  the pelvis. Small right inguinal hernia containing fat.      Impression    IMPRESSION:   1. Apparent mild wall thickening of the cecum and proximal ascending  colon. This is suggestive of infectious or inflammatory colitis.  Recommend correlation with the patient's symptoms.  2. No other cause of acute pain identified in the abdomen or pelvis.  3. Colonic diverticulosis without convincing evidence of  diverticulitis.  4. Mild splenomegaly.  5. Two nonobstructing left renal calculi.  6. 3.6 cm distal abdominal aortic aneurysm.    BRENDA MONTERO MD

## 2018-06-10 NOTE — PROGRESS NOTES
Pt discharged to home today with family.  Scripts were filled and given to pt.  Pt and wife expressed understanding of discharge instructions.  Pt left floor via WC with writer.

## 2018-06-10 NOTE — PLAN OF CARE
Problem: Patient Care Overview  Goal: Plan of Care/Patient Progress Review  Outcome: No Change  A&O, up independently, enteric iso, loose stools continue, oral vanco, VSS, NS 75/hr, possible discharge today

## 2018-06-12 LAB
BACTERIA SPEC CULT: NO GROWTH
BACTERIA SPEC CULT: NO GROWTH
Lab: NORMAL
SPECIMEN SOURCE: NORMAL
SPECIMEN SOURCE: NORMAL

## 2018-06-13 LAB
BACTERIA SPEC CULT: NO GROWTH
BACTERIA SPEC CULT: NO GROWTH
Lab: NORMAL
Lab: NORMAL
SPECIMEN SOURCE: NORMAL
SPECIMEN SOURCE: NORMAL

## 2020-07-10 ENCOUNTER — TRANSFERRED RECORDS (OUTPATIENT)
Dept: HEALTH INFORMATION MANAGEMENT | Facility: CLINIC | Age: 80
End: 2020-07-10

## 2020-07-22 ENCOUNTER — TRANSFERRED RECORDS (OUTPATIENT)
Dept: HEALTH INFORMATION MANAGEMENT | Facility: CLINIC | Age: 80
End: 2020-07-22

## 2020-07-28 ENCOUNTER — TRANSFERRED RECORDS (OUTPATIENT)
Dept: HEALTH INFORMATION MANAGEMENT | Facility: CLINIC | Age: 80
End: 2020-07-28

## 2020-08-20 ENCOUNTER — TRANSFERRED RECORDS (OUTPATIENT)
Dept: HEALTH INFORMATION MANAGEMENT | Facility: CLINIC | Age: 80
End: 2020-08-20

## 2020-09-14 ENCOUNTER — HOSPITAL ENCOUNTER (OUTPATIENT)
Dept: ULTRASOUND IMAGING | Facility: CLINIC | Age: 80
Discharge: HOME | End: 2020-09-14
Attending: PHYSICIAN ASSISTANT | Admitting: PHYSICIAN ASSISTANT
Payer: COMMERCIAL

## 2020-09-14 ENCOUNTER — HOSPITAL ENCOUNTER (EMERGENCY)
Facility: CLINIC | Age: 80
Discharge: HOME | End: 2020-09-14
Attending: EMERGENCY MEDICINE | Admitting: EMERGENCY MEDICINE
Payer: COMMERCIAL

## 2020-09-14 VITALS
DIASTOLIC BLOOD PRESSURE: 63 MMHG | HEART RATE: 85 BPM | RESPIRATION RATE: 18 BRPM | TEMPERATURE: 99.7 F | WEIGHT: 220 LBS | OXYGEN SATURATION: 97 % | BODY MASS INDEX: 33.45 KG/M2 | SYSTOLIC BLOOD PRESSURE: 130 MMHG

## 2020-09-14 DIAGNOSIS — I82.409 DVT OF LEG (DEEP VENOUS THROMBOSIS) (H): ICD-10-CM

## 2020-09-14 DIAGNOSIS — M79.662 PAIN OF LEFT CALF: ICD-10-CM

## 2020-09-14 DIAGNOSIS — I72.4 ANEURYSM ARTERY, POPLITEAL (H): ICD-10-CM

## 2020-09-14 PROCEDURE — 99283 EMERGENCY DEPT VISIT LOW MDM: CPT | Mod: 25

## 2020-09-14 PROCEDURE — 93971 EXTREMITY STUDY: CPT | Mod: LT

## 2020-09-14 ASSESSMENT — ENCOUNTER SYMPTOMS
NUMBNESS: 0
WEAKNESS: 0

## 2020-09-14 NOTE — ED TRIAGE NOTES
"Orthopedic UC sent here for LLE ultrasound to r/o DVT.  \"calf pain and swelling\"  Patient endorses left knee pain ~ 7-10 days.  "

## 2020-09-14 NOTE — ED AVS SNAPSHOT
Emergency Department  [address] [facility] [address]  Phone:  [phone]  Fax:  [phone]                                    Jamie Urban   MRN: [id number]    Department:   Emergency Department   Date of Visit:  9/14/2020           After Visit Summary Signature Page    I have received my discharge instructions, and my questions have been answered. I have discussed any challenges I see with this plan with the nurse or doctor.    ..........................................................................................................................................  Patient/Patient Representative Signature      ..........................................................................................................................................  Patient Representative Print Name and Relationship to Patient    ..................................................               ................................................  Date                                   Time    ..........................................................................................................................................  Reviewed by Signature/Title    ...................................................              ..............................................  Date                                               Time          22EPIC Rev 08/18

## 2020-09-14 NOTE — ED PROVIDER NOTES
History   Chief Complaint:  Leg Pain    HPI   Jamie Urban is a 79 year old male, with a history of COPD, hypertension, hyperlipidemia, and AAA, who presents to the ED for evaluation of leg pain. The patient reports he had the sudden onset of left foot pain when he was walking down the hallway around 7-10 days ago. However, he notes the pain in his left foot actually has subsided, but now he endorses pain behind his left knee. He mentions he went into [facility] today for evaluation of this pain, received an ultrasound, and was then subsequently sent to the emergency department as they found an aneurysm. Here in the emergency department, he continues to endorse pain behind the left knee. He denies any numbness, weakness, or loss of warmth to the left extremity,     Imaging  US LEFT LOWER EXTREMITY VENOUS DUPLEX ULTRASOUND   9/14/2020 6:08 PM  1.  No deep venous thrombosis in the left lower extremity.  2.  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      Allergies:  Sulfa Drugs  Varenicline  Bupropion    Medications:    Albuterol  Alendronate  Questran  Lisinopril  Dulera    Past Medical History:    Hyperparathyroidism  SIRS  Hypertension  COPD  Hyperlipidemia  Osteoporosis  Sleep apnea  AAA    Past Surgical History:    Laser Holmium lithotripsy pancrease  Parathyroidectomy  Right cataract removal  ES[facility] right    Family History:    History reviewed. No pertinent family history.     Social History:  Smoking status: Former  Alcohol use: Yes  Drug use: No  PCP: See Huffman  Marital Status:   [2]    Review of Systems   Musculoskeletal:        Right knee pain   Skin:        No loss of warmth   Neurological: Negative for weakness and numbness.   All other systems reviewed and are negative.    Physical Exam     Patient Vitals for the past 24 hrs:   BP Temp Temp src Pulse Resp SpO2 Weight   09/14/20 1820 130/63 99.7  F (37.6  C) Temporal 85 18 97 % 99.8 kg (220 lb)       Physical Exam  Vitals: reviewed by  me  General: Pt seen on [facility], cooperative, and alert to conversation  Eyes: Tracking well, clear conjunctiva BL  ENT: MMM, midline trachea.   Lungs:  No tachypnea, no accessory muscle use. No respiratory distress.   CV: Rate as above, regular rhythm.    MSK: LLE with sensation intact light touch throughout, and 5 out of 5 motor throughout.  Does have some symmetric swelling noted, but it is chronic appearing.  Robust dorsalis pedis and posterior tibialis pulses.  No masses felt in the popliteal fossa.  Full range of motion to all joints, normal skin exam.  Skin: No rash, normal turgor and temperature  Neuro: Clear speech and no facial droop.  Psych: Not RIS, no e/o AH/VH      Emergency Department Course   Emergency Department Course:  Past medical records, nursing notes, and vitals reviewed.    1826 I performed an exam of the patient as documented above.     1914 I spoke to Dr. Ortiz on-call for vascular surgery.    1915 I rechecked the patient and discussed the results of his workup thus far.     1940 I rechecked the patient. Explained findings to patient.    Findings and plan explained to the Patient. Patient discharged home with instructions regarding supportive care, medications, and reasons to return. The importance of close follow-up was reviewed.    Impression & Plan   Medical Decision Making:  Jamie Urban is a very pleasant 79 year old male who presents to the emergency department with an ultrasound that shows what appears to be a small arterial aneurysm in his left popliteal artery. There appears to be no acute change, though, again, there is a mural thrombus. He had a robust vascular exam of his left foot with robust dorsalis pedis pulse and PT pulse as well. I spoke to Dr. Ortiz, a vascular surgeon who reviewed the imaging and stated the patient is stable for outpatient management. Mr. Urban is okay with this plan, as is his wife at bed side, all questions were answered, will  plan for discharge home with prompt follow up with Dr. Ortiz of vascular surgery.     Diagnosis:    ICD-10-CM    1. 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    Scribe Disclosure:  Quan ZEPEDA, am serving as a scribe at 6:26 PM on 9/14/2020 to document services personally performed by Johnathan Martínez MD based on my observations and the provider's statements to me.        Johnathan Martínez MD  09/14/20 2140

## 2020-09-15 ENCOUNTER — AMBULATORY - HEALTHEAST (OUTPATIENT)
Dept: VASCULAR SURGERY | Facility: CLINIC | Age: 80
End: 2020-09-15

## 2020-09-15 DIAGNOSIS — I72.4 POPLITEAL ARTERY ANEURYSM (H): ICD-10-CM

## 2020-09-15 NOTE — DISCHARGE INSTRUCTIONS
As we talked about, need to come back to the emergency room immediately with any acute pain, or any change in your symptoms or in the skin around your knee and leg.  Come back with any other concerns.  I spoke with your vascular surgeon, and you should expect a call from his office tomorrow, and likely will see him in clinic in [place] this Friday.

## 2020-09-17 ENCOUNTER — HOSPITAL ENCOUNTER (OUTPATIENT)
Dept: CT IMAGING | Facility: CLINIC | Age: 80
Discharge: HOME | End: 2020-09-17
Attending: SURGERY

## 2020-09-17 DIAGNOSIS — I72.4 POPLITEAL ARTERY ANEURYSM (H): ICD-10-CM

## 2020-09-17 LAB
CREAT BLD-MCNC: 1.3 MG/DL (ref 0.7–1.3)
GFR SERPL CREATININE-BSD FRML MDRD: 53 ML/MIN/1.73M2

## 2020-09-18 ENCOUNTER — RECORDS - HEALTHEAST (OUTPATIENT)
Dept: VASCULAR ULTRASOUND | Facility: CLINIC | Age: 80
End: 2020-09-18

## 2020-09-18 ENCOUNTER — OFFICE VISIT - HEALTHEAST (OUTPATIENT)
Dept: VASCULAR SURGERY | Facility: CLINIC | Age: 80
End: 2020-09-18

## 2020-09-18 DIAGNOSIS — I72.4 ANEURYSM OF ARTERY OF LOWER EXTREMITY (H): ICD-10-CM

## 2020-09-18 DIAGNOSIS — I72.4 POPLITEAL ARTERY ANEURYSM (H): ICD-10-CM

## 2020-09-18 DIAGNOSIS — Z01.818 PRE-OP EVALUATION: ICD-10-CM

## 2020-09-18 DIAGNOSIS — R06.02 SOB (SHORTNESS OF BREATH): ICD-10-CM

## 2020-09-18 DIAGNOSIS — I73.9 PAD (PERIPHERAL ARTERY DISEASE) (H): ICD-10-CM

## 2020-09-19 ENCOUNTER — COMMUNICATION - HEALTHEAST (OUTPATIENT)
Dept: SCHEDULING | Facility: CLINIC | Age: 80
End: 2020-09-19

## 2020-09-19 ENCOUNTER — NURSE TRIAGE (OUTPATIENT)
Dept: NURSING | Facility: CLINIC | Age: 80
End: 2020-09-19

## 2020-09-19 NOTE — TELEPHONE ENCOUNTER
Patient followed by  Vascular team. See details on HE chart.    Marta Monroy RN/[place] Nurse Advisor      Additional Information    Health Information question, no triage required and triager able to answer question    Protocols used: INFORMATION ONLY CALL-A-AH

## 2020-09-21 ENCOUNTER — SURGERY - HEALTHEAST (OUTPATIENT)
Dept: VASCULAR SURGERY | Facility: CLINIC | Age: 80
End: 2020-09-21

## 2020-09-21 ENCOUNTER — COMMUNICATION - HEALTHEAST (OUTPATIENT)
Dept: VASCULAR SURGERY | Facility: CLINIC | Age: 80
End: 2020-09-21

## 2020-09-21 DIAGNOSIS — I73.9 PAD (PERIPHERAL ARTERY DISEASE) (H): ICD-10-CM

## 2020-09-21 DIAGNOSIS — I71.40 ABDOMINAL AORTIC ANEURYSM (AAA) WITHOUT RUPTURE (H): ICD-10-CM

## 2020-09-23 ENCOUNTER — COMMUNICATION - HEALTHEAST (OUTPATIENT)
Dept: VASCULAR SURGERY | Facility: CLINIC | Age: 80
End: 2020-09-23

## 2020-09-24 ENCOUNTER — COMMUNICATION - HEALTHEAST (OUTPATIENT)
Dept: VASCULAR SURGERY | Facility: CLINIC | Age: 80
End: 2020-09-24

## 2020-09-24 ENCOUNTER — AMBULATORY - HEALTHEAST (OUTPATIENT)
Dept: INTERVENTIONAL RADIOLOGY/VASCULAR | Facility: CLINIC | Age: 80
End: 2020-09-24

## 2020-09-24 DIAGNOSIS — Z11.59 ENCOUNTER FOR SCREENING FOR OTHER VIRAL DISEASES: ICD-10-CM

## 2020-09-24 DIAGNOSIS — Z20.822 COVID-19 RULED OUT: Primary | ICD-10-CM

## 2020-09-28 ENCOUNTER — HOSPITAL ENCOUNTER (OUTPATIENT)
Dept: ULTRASOUND IMAGING | Facility: CLINIC | Age: 80
Discharge: HOME | End: 2020-09-28
Attending: SURGERY

## 2020-09-28 ENCOUNTER — HOSPITAL ENCOUNTER (OUTPATIENT)
Dept: CT IMAGING | Facility: CLINIC | Age: 80
Discharge: HOME | End: 2020-09-28
Attending: SURGERY

## 2020-09-28 ENCOUNTER — RECORDS - HEALTHEAST (OUTPATIENT)
Dept: ADMINISTRATIVE | Facility: OTHER | Age: 80
End: 2020-09-28

## 2020-09-28 ENCOUNTER — AMBULATORY - HEALTHEAST (OUTPATIENT)
Dept: MULTI SPECIALTY CLINIC | Facility: CLINIC | Age: 80
End: 2020-09-28

## 2020-09-28 DIAGNOSIS — I71.40 ABDOMINAL AORTIC ANEURYSM (AAA) WITHOUT RUPTURE (H): ICD-10-CM

## 2020-09-28 DIAGNOSIS — I73.9 PAD (PERIPHERAL ARTERY DISEASE) (H): ICD-10-CM

## 2020-09-28 DIAGNOSIS — Z01.818 PRE-OP EVALUATION: ICD-10-CM

## 2020-09-28 DIAGNOSIS — I72.4 POPLITEAL ARTERY ANEURYSM (H): ICD-10-CM

## 2020-09-28 LAB
CREAT SERPL-MCNC: 1.37 MG/DL (ref 0.72–1.25)
GFR ESTIMATE EXT - HISTORICAL: ABNORMAL ML/MIN/1.73M2
GFR ESTIMATE, IF BLACK EXT - HISTORICAL: 60 ML/MIN/1.73M2

## 2020-09-30 ENCOUNTER — RECORDS - HEALTHEAST (OUTPATIENT)
Dept: ADMINISTRATIVE | Facility: OTHER | Age: 80
End: 2020-09-30

## 2020-09-30 ENCOUNTER — HOSPITAL ENCOUNTER (OUTPATIENT)
Dept: NUCLEAR MEDICINE | Facility: CLINIC | Age: 80
Discharge: HOME | End: 2020-09-30
Attending: SURGERY

## 2020-09-30 ENCOUNTER — HOSPITAL ENCOUNTER (OUTPATIENT)
Dept: CARDIOLOGY | Facility: CLINIC | Age: 80
Discharge: HOME | End: 2020-09-30
Attending: SURGERY

## 2020-09-30 DIAGNOSIS — Z01.818 PRE-OP EVALUATION: ICD-10-CM

## 2020-09-30 DIAGNOSIS — R06.02 SOB (SHORTNESS OF BREATH): ICD-10-CM

## 2020-09-30 LAB
CV STRESS CURRENT BP HE: NORMAL
CV STRESS CURRENT HR HE: 101
CV STRESS CURRENT HR HE: 102
CV STRESS CURRENT HR HE: 104
CV STRESS CURRENT HR HE: 105
CV STRESS CURRENT HR HE: 106
CV STRESS CURRENT HR HE: 79
CV STRESS CURRENT HR HE: 88
CV STRESS CURRENT HR HE: 92
CV STRESS CURRENT HR HE: 94
CV STRESS CURRENT HR HE: 96
CV STRESS CURRENT HR HE: 97
CV STRESS CURRENT HR HE: 97
CV STRESS CURRENT HR HE: 98
CV STRESS DEVIATION TIME HE: NORMAL
CV STRESS ECHO PERCENT HR HE: NORMAL
CV STRESS EXERCISE STAGE HE: NORMAL
CV STRESS FINAL RESTING BP HE: NORMAL
CV STRESS FINAL RESTING HR HE: 98
CV STRESS MAX HR HE: 107
CV STRESS MAX TREADMILL GRADE HE: 0
CV STRESS MAX TREADMILL SPEED HE: 0
CV STRESS PEAK DIA BP HE: NORMAL
CV STRESS PEAK SYS BP HE: NORMAL
CV STRESS PHASE HE: NORMAL
CV STRESS PROTOCOL HE: NORMAL
CV STRESS RESTING PT POSITION HE: NORMAL
CV STRESS RESTING PT POSITION HE: NORMAL
CV STRESS ST DEVIATION AMOUNT HE: NORMAL
CV STRESS ST DEVIATION ELEVATION HE: NORMAL
CV STRESS ST EVELATION AMOUNT HE: NORMAL
CV STRESS TEST TYPE HE: NORMAL
CV STRESS TOTAL STAGE TIME MIN 1 HE: NORMAL
NUC STRESS EJECTION FRACTION: 55 %
RATE PRESSURE PRODUCT: NORMAL
STRESS ECHO BASELINE DIASTOLIC HE: 68
STRESS ECHO BASELINE HR: 85
STRESS ECHO BASELINE SYSTOLIC BP: 143
STRESS ECHO CALCULATED PERCENT HR: 77 %
STRESS ECHO LAST STRESS DIASTOLIC BP: 84
STRESS ECHO LAST STRESS HR: 102
STRESS ECHO LAST STRESS SYSTOLIC BP: 141
STRESS ECHO TARGET HR: 139

## 2020-10-03 DIAGNOSIS — Z20.822 COVID-19 RULED OUT: ICD-10-CM

## 2020-10-03 PROCEDURE — U0003 INFECTIOUS AGENT DETECTION BY NUCLEIC ACID (DNA OR RNA); SEVERE ACUTE RESPIRATORY SYNDROME CORONAVIRUS 2 (SARS-COV-2) (CORONAVIRUS DISEASE [COVID-19]), AMPLIFIED PROBE TECHNIQUE, MAKING USE OF HIGH THROUGHPUT TECHNOLOGIES AS DESCRIBED BY CMS-2020-01-R: HCPCS | Performed by: SURGERY

## 2020-10-04 LAB
SARS-COV-2 RNA SPEC QL NAA+PROBE: NOT DETECTED
SPECIMEN SOURCE: NORMAL

## 2020-10-06 ENCOUNTER — ANESTHESIA - HEALTHEAST (OUTPATIENT)
Dept: SURGERY | Facility: CLINIC | Age: 80
End: 2020-10-06

## 2020-10-06 ENCOUNTER — RECORDS - HEALTHEAST (OUTPATIENT)
Dept: ADMINISTRATIVE | Facility: OTHER | Age: 80
End: 2020-10-06

## 2020-10-08 ASSESSMENT — MIFFLIN-ST. JEOR: SCORE: 1666.57

## 2020-10-09 ASSESSMENT — MIFFLIN-ST. JEOR: SCORE: 1715.95

## 2020-10-12 ENCOUNTER — AMBULATORY - HEALTHEAST (OUTPATIENT)
Dept: OTHER | Facility: CLINIC | Age: 80
End: 2020-10-12

## 2020-10-12 ENCOUNTER — SURGERY - HEALTHEAST (OUTPATIENT)
Dept: SURGERY | Facility: CLINIC | Age: 80
End: 2020-10-12

## 2020-10-12 ENCOUNTER — DOCUMENTATION ONLY (OUTPATIENT)
Dept: OTHER | Facility: CLINIC | Age: 80
End: 2020-10-12

## 2020-10-12 ASSESSMENT — MIFFLIN-ST. JEOR: SCORE: 1701.44

## 2020-10-13 ASSESSMENT — MIFFLIN-ST. JEOR: SCORE: 1724.57

## 2020-10-15 ASSESSMENT — MIFFLIN-ST. JEOR: SCORE: 1773.11

## 2020-10-16 ASSESSMENT — MIFFLIN-ST. JEOR: SCORE: 1764.94

## 2020-10-17 ASSESSMENT — MIFFLIN-ST. JEOR: SCORE: 1740.45

## 2020-10-18 ASSESSMENT — MIFFLIN-ST. JEOR: SCORE: 1703.71

## 2020-10-19 ENCOUNTER — COMMUNICATION - HEALTHEAST (OUTPATIENT)
Dept: SCHEDULING | Facility: CLINIC | Age: 80
End: 2020-10-19

## 2020-10-19 ASSESSMENT — MIFFLIN-ST. JEOR: SCORE: 1705.52

## 2020-10-20 ENCOUNTER — AMBULATORY - HEALTHEAST (OUTPATIENT)
Dept: VASCULAR SURGERY | Facility: CLINIC | Age: 80
End: 2020-10-20

## 2020-10-20 DIAGNOSIS — I73.9 PAD (PERIPHERAL ARTERY DISEASE) (H): ICD-10-CM

## 2020-10-20 ASSESSMENT — MIFFLIN-ST. JEOR: SCORE: 1646.55

## 2020-10-23 ENCOUNTER — COMMUNICATION - HEALTHEAST (OUTPATIENT)
Dept: VASCULAR SURGERY | Facility: CLINIC | Age: 80
End: 2020-10-23

## 2020-10-25 ENCOUNTER — RECORDS - HEALTHEAST (OUTPATIENT)
Dept: ADMINISTRATIVE | Facility: OTHER | Age: 80
End: 2020-10-25

## 2020-10-25 ENCOUNTER — RECORDS - HEALTHEAST (OUTPATIENT)
Dept: LAB | Facility: CLINIC | Age: 80
End: 2020-10-25

## 2020-10-26 ENCOUNTER — OFFICE VISIT - HEALTHEAST (OUTPATIENT)
Dept: VASCULAR SURGERY | Facility: CLINIC | Age: 80
End: 2020-10-26

## 2020-10-26 DIAGNOSIS — I73.9 PAD (PERIPHERAL ARTERY DISEASE) (H): ICD-10-CM

## 2020-10-26 DIAGNOSIS — D64.9 ANEMIA: ICD-10-CM

## 2020-10-26 LAB
ANION GAP SERPL CALCULATED.3IONS-SCNC: 6 MMOL/L (ref 5–18)
BUN SERPL-MCNC: 30 MG/DL (ref 8–28)
CALCIUM SERPL-MCNC: 7.6 MG/DL (ref 8.5–10.5)
CHLORIDE BLD-SCNC: 107 MMOL/L (ref 98–107)
CO2 SERPL-SCNC: 26 MMOL/L (ref 22–31)
CREAT SERPL-MCNC: 1.03 MG/DL (ref 0.7–1.3)
GFR SERPL CREATININE-BSD FRML MDRD: >60 ML/MIN/1.73M2
GLUCOSE BLD-MCNC: 110 MG/DL (ref 70–125)
HGB BLD-MCNC: 6.8 G/DL (ref 14–18)
POTASSIUM BLD-SCNC: 4.2 MMOL/L (ref 3.5–5)
SODIUM SERPL-SCNC: 139 MMOL/L (ref 136–145)

## 2020-10-26 ASSESSMENT — MIFFLIN-ST. JEOR
SCORE: 1705.98
SCORE: 1713.23

## 2020-10-28 ENCOUNTER — COMMUNICATION - HEALTHEAST (OUTPATIENT)
Dept: SCHEDULING | Facility: CLINIC | Age: 80
End: 2020-10-28

## 2020-10-29 ENCOUNTER — ANESTHESIA - HEALTHEAST (OUTPATIENT)
Dept: SURGERY | Facility: HOSPITAL | Age: 80
End: 2020-10-29

## 2020-10-29 ENCOUNTER — SURGERY - HEALTHEAST (OUTPATIENT)
Dept: SURGERY | Facility: HOSPITAL | Age: 80
End: 2020-10-29

## 2020-10-30 ASSESSMENT — MIFFLIN-ST. JEOR: SCORE: 1710.51

## 2020-11-02 ENCOUNTER — SURGERY - HEALTHEAST (OUTPATIENT)
Dept: CARDIOLOGY | Facility: CLINIC | Age: 80
End: 2020-11-02

## 2020-11-02 ASSESSMENT — MIFFLIN-ST. JEOR
SCORE: 1785.07
SCORE: 1785.07

## 2020-11-03 ASSESSMENT — MIFFLIN-ST. JEOR
SCORE: 1769.48
SCORE: 1769.48

## 2020-11-04 ASSESSMENT — MIFFLIN-ST. JEOR
SCORE: 1755.42
SCORE: 1755.42

## 2020-11-05 ASSESSMENT — MIFFLIN-ST. JEOR
SCORE: 1754.06
SCORE: 1754.06

## 2020-11-06 ASSESSMENT — MIFFLIN-ST. JEOR
SCORE: 1762.22
SCORE: 1762.22

## 2020-11-07 ASSESSMENT — MIFFLIN-ST. JEOR
SCORE: 1780.82
SCORE: 1780.82

## 2020-11-08 ASSESSMENT — MIFFLIN-ST. JEOR
SCORE: 1766.3
SCORE: 1766.3

## 2020-11-09 ENCOUNTER — ANESTHESIA - HEALTHEAST (OUTPATIENT)
Dept: SURGERY | Facility: CLINIC | Age: 80
End: 2020-11-09

## 2020-11-09 ENCOUNTER — SURGERY - HEALTHEAST (OUTPATIENT)
Dept: SURGERY | Facility: CLINIC | Age: 80
End: 2020-11-09

## 2020-11-10 ASSESSMENT — MIFFLIN-ST. JEOR
SCORE: 1744.53
SCORE: 1744.53

## 2020-11-11 ASSESSMENT — MIFFLIN-ST. JEOR
SCORE: 1764.94
SCORE: 1764.94

## 2020-11-12 ENCOUNTER — COMMUNICATION - HEALTHEAST (OUTPATIENT)
Dept: VASCULAR SURGERY | Facility: CLINIC | Age: 80
End: 2020-11-12

## 2020-11-12 ASSESSMENT — MIFFLIN-ST. JEOR
SCORE: 1754.96
SCORE: 1754.96

## 2020-11-13 ASSESSMENT — MIFFLIN-ST. JEOR
SCORE: 1754.51
SCORE: 1754.51

## 2020-11-14 ASSESSMENT — MIFFLIN-ST. JEOR
SCORE: 1750.88
SCORE: 1750.88

## 2020-11-15 ASSESSMENT — MIFFLIN-ST. JEOR
SCORE: 1730.02
SCORE: 1730.02

## 2020-11-16 ASSESSMENT — MIFFLIN-ST. JEOR
SCORE: 1730.92
SCORE: 1730.92

## 2020-11-17 ASSESSMENT — MIFFLIN-ST. JEOR
SCORE: 1731.83
SCORE: 1731.83

## 2020-11-18 ASSESSMENT — MIFFLIN-ST. JEOR
SCORE: 1696.9
SCORE: 1696.9

## 2020-11-19 ASSESSMENT — MIFFLIN-ST. JEOR
SCORE: 1704.61
SCORE: 1704.61

## 2020-11-20 ASSESSMENT — MIFFLIN-ST. JEOR
SCORE: 1669.69
SCORE: 1669.69

## 2020-11-21 ASSESSMENT — MIFFLIN-ST. JEOR
SCORE: 1681.93
SCORE: 1681.93

## 2020-11-22 ASSESSMENT — MIFFLIN-ST. JEOR
SCORE: 1683.75
SCORE: 1683.75

## 2020-11-23 ASSESSMENT — MIFFLIN-ST. JEOR
SCORE: 1674.22
SCORE: 1674.22

## 2020-11-24 ASSESSMENT — MIFFLIN-ST. JEOR
SCORE: 1667.42
SCORE: 1667.42

## 2020-11-25 ASSESSMENT — MIFFLIN-ST. JEOR
SCORE: 1668.33
SCORE: 1668.33

## 2020-11-26 ASSESSMENT — MIFFLIN-ST. JEOR
SCORE: 1653.36
SCORE: 1653.36

## 2020-11-27 ASSESSMENT — MIFFLIN-ST. JEOR
SCORE: 1657.89
SCORE: 1657.89

## 2020-11-28 ASSESSMENT — MIFFLIN-ST. JEOR
SCORE: 1642.02
SCORE: 1642.02

## 2020-11-29 ASSESSMENT — MIFFLIN-ST. JEOR
SCORE: 1667.87
SCORE: 1667.87

## 2020-11-30 ASSESSMENT — MIFFLIN-ST. JEOR
SCORE: 1619.79
SCORE: 1619.79

## 2020-12-01 ASSESSMENT — MIFFLIN-ST. JEOR
SCORE: 1644.29
SCORE: 1644.29

## 2020-12-03 ASSESSMENT — MIFFLIN-ST. JEOR
SCORE: 1578.97
SCORE: 1578.97

## 2021-05-26 ENCOUNTER — RECORDS - HEALTHEAST (OUTPATIENT)
Dept: ADMINISTRATIVE | Facility: CLINIC | Age: 81
End: 2021-05-26

## 2021-05-30 ENCOUNTER — RECORDS - HEALTHEAST (OUTPATIENT)
Dept: ADMINISTRATIVE | Facility: CLINIC | Age: 81
End: 2021-05-30

## 2021-05-31 ENCOUNTER — RECORDS - HEALTHEAST (OUTPATIENT)
Dept: ADMINISTRATIVE | Facility: CLINIC | Age: 81
End: 2021-05-31

## 2021-06-05 VITALS — HEIGHT: 67 IN | BODY MASS INDEX: 34.46 KG/M2 | WEIGHT: 224 LBS | BODY MASS INDEX: 35.08 KG/M2

## 2021-06-11 NOTE — PROGRESS NOTES
VASCULAR SURGERY CLINIC CONSULTATION    VASCULAR SURGEON: Maria Esther Ortiz MD, RPVI     LOCATION:  [facility]    Jamie Urban   Medical Record #:  [id number]  YOB: 1940  Age:  79 y.o.     Date of Service: 9/18/2020    PRIMARY CARE PROVIDER: See Huffman MD      Reason for visit: Thrombosed left popliteal artery aneurysm.    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  Severe pain at rest with decreased overall function of the left lower extremity with ABIs 0.5.  Normal ABIs on the right.  Ultrasound did confirm occlusion of the popliteal artery aneurysm.  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  Patient is symptomatic.  I discussed both options for the treatment of this difficult problem.  His symptoms started over 3 weeks ago so the TPA could be a potential treatment with subsequent stenting of the left posterior artery aneurysm.  Upon the discussion of potential stroke risk associated with TPA treatment patient became very hesitant and did not want proceed with this option.  I also explained that the timing issue given that his symptoms have started over 3 weeks ago so the success would be definitely less likely.  The second option would be to proceed with a bypass operation.  Patient is relatively active and is in a good shape.  Should he decide to proceed with operation we will have to do an angiogram first to look for runoff.  We will also order vein mapping, stress test, and CT of the chest to exclude potential thoracic aortic aneurysm.  Patient does have concomitant small AAA as well as right common iliac artery aneurysm.  The right common iliac artery aneurysm will need potential treatment given its current size.  Patient would like to think about options and will give us a call on Monday with final decision.  All the questions were answered.    RECOMMENDATION/RISKS: Most likely patient will need a bypass operation.  We will  schedule preoperative work-up.  Patient also will need an angiogram prior to the operation.    HPI:  Jamie Urban is a 79 y.o. male who was seen today in consultation for left lower extremity pain.  Patient states that over 3 weeks ago he developed sharp discomfort in his left calf.  He states that after that incident he was not able to walk for long distance.  He also developed pain at rest.  Denies any neuro or motor deficits.  He can only walk for 20 to 40 feet before stopping.  Denies any nonhealing wound.    REVIEW OF SYSTEMS:    A 12 point ROS was reviewed and is negative except for left lower extremity pain.    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  Has summer home on [address] [facility]. , wife has diabetes and some other health issues. 4 children all here. 11/2/2009  See Huffman MD     Hyperlipidemia 12/12/2006     Hyperparathyroidism     Created by [employer] [facility] Annotation: Apr 14 2009  3:07PM Tasha Fleming: s/p resection  of R inf. adenoma 3/2009      Hypertension 10/25/2012     Lower urinary tract symptoms (LUTS) 1/2/2020     Nephrolithiasis 2/8/2007     Nicotine Dependence     Created by [employer] [facility] Annotation: Feb 18 2010  2:23PM - Tasha Guan: intolerant to  Chantix (vivid dreams)      Obstructive sleep apnea syndrome 10/25/2012    Setting: APAP 9-12 Supplied by: [employer] PSG done: 6-6-12 MSI (7/22/12 MSI titration) AHI 20 RDI 85 Lowest O2 Sat: 86% [place]/ [place] Rx/nasal 10-3-16; 5/5/17  change to APAP; 5/30/17 pressure change     Osteoporosis     Created by Conversion  Replacement Utility updated for latest IMO load     Screen for colon cancer 11/2/2009    Colonoscopy 2007 normal by his report. Repeat 10 years. 11/2/2009  See Huffman MD     Severe obesity (BMI 35.0-39.9) with comorbidity (H) 11/22/2017     SIRS (systemic inflammatory response syndrome) (H) 6/6/2018     Vitamin D deficiency     Created by Conversion  Replacement Utility updated for latest IMO load          Past Surgical History:   Procedure Laterality Date     CATARACT EXTRACTION         ALLERGIES:  Sulfa (sulfonamide antibiotics); Varenicline; and Bupropion    MEDS:    Current Outpatient Medications:      alendronate (FOSAMAX) 70 MG tablet, Take by mouth., Disp: , Rfl:      lisinopriL (PRINIVIL,ZESTRIL) 20 MG tablet, Take 20 mg by mouth., Disp: , Rfl:      mometasone-formoterol (DULERA) 100-5 mcg/actuation HFAA inhaler, Inhale 2 puffs., Disp: , Rfl:      albuterol (PROAIR HFA;PROVENTIL HFA;VENTOLIN HFA) 90 mcg/actuation inhaler, INL 1 TO 2 PFS PO Q 4 H PRN, Disp: , Rfl:     SOCIAL HABITS:    Social History     Tobacco Use   Smoking Status Not on file       Social History     Substance and Sexual Activity   Alcohol Use Not on file       Social History     Substance and Sexual Activity   Drug Use Not on file       FAMILY HISTORY:  No family history on file.    PE:  /70   Pulse 68   Temp 98.8  F (37.1  C)   Resp 18   Wt Readings from Last 1 Encounters:   No data found for Wt     There is no height or weight on file to calculate BMI.    EXAM:  GENERAL: This is a well-developed 79 y.o. male who appears his stated age  EYES: Grossly normal.  MOUTH: Buccal mucosa normal   CARDIAC:  Normal S1 and S2, no Murmur  CHEST/LUNG:  Clear lung fields bilaterally  GASTROINTESINAL (ABDOMEN):Soft, non-tender, B/S present, no pulsatile mass   MUSCULOSKELETAL: Grossly normal and both lower extremities are intact.  HEME/LYMPH: No  lymphedema  NEUROLOGIC: Focally intact, Alert and oriented x 3.   PSYCH: appropriate affect  INTEGUMENT: No open lesions or ulcers  Pulse Exam:   Carotid: No Bruit    Monophasic signals present on the left.  Triphasic signals present on the right.        DIAGNOSTIC STUDIES:     Images:  Cta Abdominal Aorta Iliofemoral Runoff    Result Date: 9/18/2020  [facility] 9/18/2020 EXAM: CTA ABDOMEN, PELVIS, AND BILATERAL LOWER EXTREMITY RUNOFF INDICATION: Follow-up popliteal artery aneurysm TECHNIQUE: Helical acquisition through the abdomen, pelvis, and bilateral lower extremities was performed during the arterial phase of contrast enhancement following the administration of intravenous contrast. 2D and 3D reconstructions performed by the CT technologist. Dose reduction techniques were used. COMPARISON: Arterial duplex, 9/18/2020 CONTRAST: 100 cc of Omni FINDINGS: CTA ABDOMEN AND PELVIS: Infrarenal abdominal aortic aneurysm measuring 3.2 x 3.5 cm. Right common iliac artery aneurysm measuring 2.9 cm. Ectatic left common iliac artery measuring 1.8 cm. Aneurysmal right internal iliac artery measuring 2.0 cm. Focal aneurysmal left internal iliac artery measuring 1.3 cm. Bilateral external iliac arteries are patent and of normal size. 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 SMA is patent. Focal aneurysmal dilatation of the distal segment of the SMA measuring a maximum dimension of 1.1 cm. Bilateral renal arteries are patent. CTA RIGHT LEG: Ectatic right common femoral artery measuring 1.4 cm. Right profunda femoral artery is patent. Right SFA is patent. Aneurysmal right popliteal artery measuring a maximum diameter of 1.7 cm. There is flow through the right popliteal artery aneurysm. The below knee popliteal artery is patent. Due to poor contrast opacification cannot assess for runoff below the knee. CTA LEFT LEG: Ectatic left common femoral artery  measuring a maximum dimension of 1.3 cm. Left profunda femoral artery is patent. Left SFA is patent. 4.0 cm large left popliteal artery aneurysm which appears to likely be occluded. There are some collateral flow which provides reconstitution of flow into the left peroneal artery and anterior tibial artery. Unable to assess patency of the left PTA due to poor contrast opacification past the mid calf. ABDOMEN AND PELVIS: No liver mass. Splenomegaly. Varicosities involving the spleen or renal veins consistent with portal hypertension. No calcified gallstones. Pancreas is normal. Adrenal glands are normal. Urinary bladder is normal. No dilated loops of large or small bowel. No pericolonic fat stranding. No pelvic free fluid or adenopathy. Bilateral renal cysts. No hydronephrosis. LUNG BASES: Negative.     1.  Infrarenal abdominal aortic aneurysm measuring 8.2 x 3.5 cm. Right common iliac artery aneurysm measuring 2.9 cm. Ectatic/aneurysmal left common iliac artery measuring 1.8 cm. Aneurysmal right internal iliac artery measuring 2.0 cm. Focal aneurysm of  the left internal iliac artery measuring 1.3 cm. 2.  Bilateral external iliac arteries are patent and of normal size. 3.  Right leg: Ectatic right common femoral artery measuring 1.4 cm in diameter. Right profunda femoral artery is patent. Right SFA is patent. Right popliteal artery aneurysm measuring 1.7 cm in diameter. The right popliteal artery aneurysm is patent with flow through the popliteal artery. The below knee popliteal artery is patent. Due to poor contrast opacification cannot assess runoff below the knee. 4.  Left leg: Ectatic left common femoral artery measuring 1.3 cm in diameter. Left profunda femoral artery is patent. Left SFA is patent. Large 4 cm left popliteal artery aneurysm which appears to likely be thrombosed. There is some collateral flow providing reconstitution of flow into the left anterior tibial artery and peroneal artery. Unable to  assess patency of the left PTA due to poor contrast opacification past the mid calf. 5.  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 Focal aneurysmal dilatation of the distal segment of the SMA measuring 1.1 cm.    Us Arterial Leg Left    Result Date: 9/18/2020  LEFT Limited Arterial Duplex Ultrasound Bilateral Lower Extremities  Indication: Surveillance of left popliteal artery aneurysm.  Previous Aneurysm Dimensions:  2.90 cm (09/13/2020) History: Previous Smoker, Hypertension and Rest Pain Technique: Duplex imaging is performed utilizing gray-scale, two dimensional images, and color-flow imaging. Doppler waveform analysis and spectral Doppler imagine is also performed. Left Leg  Waveforms: Triphasic= T, Biphasic= B, Monophasic=M Location Velocity (cm/sec)  Waveforms  Diameter (APxWidth) (CM) MARGARITO 0  - 3.48 x 2.98 Aneurysm dimensions (AP x Width):   3.48 cm   X   2.98 cm Comments: Occlusion of distal popliteal artery distal to aneurysm. This is consistent with the CTA. Impression: Occluded left popliteal artery aneurysm.  Minimal flow distally.       I personally reviewed the images and my interpretation is occlusion of the left popliteal artery aneurysm confirmed by ultrasound and CTA..    LABS:      Creatinine   Date Value Ref Range Status   02/18/2010 1.2 0.6 - 1.3 mg/dL Final       Total time spent 45 minutes face to face with patient with more than 50% time spent in counseling and coordination of care.    Maria Esther Ortiz MD, [facility]  VASCULAR SURGERY

## 2021-06-11 NOTE — PATIENT INSTRUCTIONS - HE
Your are scheduled for a  Lexiscan- Nuclear Stress Test  Your appointment is scheduled on ___________________________      Your physician has referred you for a Cardiac MRI exam.  Our staff is dedicated to providing you and your physician with the best possible Cardiac MRI imaging experience.  Please take a few minutes to read this general information about Cardiac MRI to help prepare for your exam.  Please ask if you have any questions.       What is a Cardiac MRI?  Magnetic Resonance Imaging (MRI) is a diagnostic procedure that allows physicians the ability to view specific areas of your body in great detail using a strong magnet and radio frequency waves.  For this Cardiac MRI exam we will be focusing on the heart and its surrounding structures.  Your procedure may include a medication used during a stress test called Lexiscan .  This will depend on the reason for your exam and is determined by the ordering physician.    What happens during a Cardiac MRI exam?  The cardiac MRI exam is painless; however, the scanner produces a loud  knocking  noise throughout the exam.  To make you comfortable, we will provide you with earplugs or music.  To ensure clear images, you will need to hold very still during the exam and hold your breath as instructed by the technologist.  The technologist conducts the test from the control room and is able to see and assist you at all times.  In many cases, a contrast is needed to obtain more detailed information.  This is done through an intravenous (IV) injection and takes only a short time.      How do I prepare for the MRI scan?  Upon arrival, you will be asked to complete a MRI questionnaire to ensure your safety within the magnetic field.  You will change into a hospital gown for the procedure.  Before the exam, you will be asked to remove your jewelry, keys, coins, and any other items deemed unsafe for MRI.  Your belongings will be locked safely outside the procedure room.       How long will this take?  The cardiac MRI will last between 60 and 90 minutes.  The number of images required may vary based on your Physician s order.  This will determine the length of your test.  You should plan for the entire visit to last between 2 and 3 hours from check-in.    What happens after the test?  You will be able to resume all normal activities following the exam unless otherwise instructed by your doctor.  A trained cardiologist and/or radiologist will interpret your exam and provide a report to your doctor within about 3-5 business days.  If you do not hear from your doctor, please contact your ordering physician s office directly.                                                  If your Cardiac MRI exam includes stress testing (Lexiscan ), you should follow these instructions:    Please let your doctor know if you are pregnant or trying to get pregnant.    Do not eat, drink or use tobacco products for four (4) hours prior to the test EXCEPT up to 500ml (or about two 8 ounce glasses) of water.    It is very important that you do not eat or drink products that contain caffeine for 24 hours before your test time.  This includes regular coffee, decaffeinated coffee, tea, cocoa, soda/pop, foods containing chocolate (candy, cake, milk, pudding, etc.) and energy drinks.  Some over the counter medications contain caffeine so please read the label or ask your pharmacist if you have questions.      Bring a written list of the prescription and non-prescription medications you are currently taking.  Include over-the-counter medications, vitamins and herbal supplements.    If you use any inhalers, please bring them with you to your appointment.      A nurse will call you 24 to 48 hours prior to your appointment to go over instructions about the test and whether or not to take your medications prior to your appointment.  Please call [phone] between 7:30am-4:00pm Monday through Friday if you have  questions for a nurse.    If you are diabetic, please contact your primary physician for instructions on your diabetic medications.      Please let your ordering doctor know if you have a history of problems with claustrophobia.  Sedation medication may be considered.  A  will be required following the test if sedation is prescribed.      SEE BELOW FOR MEDICATION INSTRUCTIONS:  Lexiscan (Regadenoson) Stress Test    Nothing to eat or drink four hours before the test    No caffeine (coffee, decaf coffee, soda, candy or Excedrin) 24 hours before the test      Aminophylline/Theophylline containing products:  Accurbon Dyphylline Quibron Gibson-24   Aerolate Emfaseem Quinamm TheoBID   Amesec Elixicon Respbid Theochron   Aminodur Elixophyllin Slo-bid Gyrocaps Theoclear   Aminophyllin Isofil Slo-Phyllin Gibson-Dur   Aminophylline Isuprel Compound Elixir Somophyllin Theofedral   Aquaphyllin LaBID Sustairre Theolair   [place] Lufyllin Synophylate Theon   Asminyl Marax [facility].[employer].H. Gibson-Organidin   Azma-Aid Mudrane Tedral Theophedrizine   Bronkaid Tabs Neothylline Gibson-24 Theophylline   Bronkodyl Phyllocontin TheoBID Theophyl   Bronkolixir Physpan Theochron Theospan   Bronkotabs Primatene Tablets Theoclear Theostat   Constant-T Pulm TD Gibson-Dur Theovent   Co-Alhaji Quadrinal Theofedral Uniphyl   Dilor        These drugs mist not be taken 12 hours before Lexiscan Administration      This drug must not be taken 24 hours before Lexiscan Administration   Trental (Pentoxifylline)    These drugs must not be taken 48 hours before Lexiscan Adminstration   Persantine (Dipryidamole) Pletal (Cilosatzol)   Aggrenox (Aspirin & Dipryidamole) Permol (Dipryidamole)     This drug must not be taken 85 hours before Lexiscan Adminstration  Roflumilast (phosphodiesterase-4 inhibitor)    Phosphodiesterase Inhibitors should be help as recommended below:    Sildenafil (Viagra)  48 hours   Vardenafil (Levitra) 48 hours   Avanafil (Stendra) 48 hours    Tadalafil (Cialis & Adcirca) 85 hours

## 2021-06-11 NOTE — TELEPHONE ENCOUNTER
Called patient confirmed 9/28/2020 @ 10:00 @ [facility] Vein mapping, CT Chest 9/28/2020 @ 11:40 @ [facility]   Nuc Med stress test 9/30/2020 @ [facility] instructed patient no caffeine no decaff no tino or ASA 24 hrs prior, NPO 4 hours prior to Stress test  Patient confirmed all informed patient I would be scheduling L Leg angiogram with I.R on a Wed admitted after and surgery to follow on Thursday @ [facility] patient understood Cd 9/23/2020

## 2021-06-11 NOTE — TELEPHONE ENCOUNTER
Spoke to patient and wife. Relayed information and also emailed the info as well to [email or web address]@"[email or web address] per patient request.

## 2021-06-11 NOTE — TELEPHONE ENCOUNTER
Patient wants to proceed with surgery with Dr. Ortiz. Writer will arrange and notify patient of tests needed done prior to surgery.

## 2021-06-11 NOTE — TELEPHONE ENCOUNTER
Jamie would like to send a message to Dr. Ortiz, informing him that onset of pain was on 9/7/2020 and not 3 weeks ago as initially stated.    He states he is tentatively scheduled for surgery related to knee aneurysm.     FNA advised to follow up with clinic on Monday in case he does not hear back from care team. He verbalized agreement.    Routed to:     Reason for Disposition    [1] Caller requesting NON-URGENT health information AND [2] PCP's office is the best resource    Protocols used: INFORMATION ONLY CALL-A-AH

## 2021-06-11 NOTE — TELEPHONE ENCOUNTER
Patient asked for a call back at home. He stated someone called about scheduling procedures prior to surgery with Dr. Ortiz.

## 2021-06-11 NOTE — TELEPHONE ENCOUNTER
Patient scheduled for left leg angiogram @ [facility] on 10/6/2020 @ 10:00am with Dr Ortiz scheduled with Marlen I.RNathalie on 9/24/2020 patient to be admitted after Marlen informed.  Patient scheduled for Creation bypass arterial femoral to popliteal using graft versus vein @ [facility] with Dr Ortiz on 10/7/2020 @ 1:00 patient needs preop H&P patient has sleep apnea confirm if has machine needs to bring to hospital, covid 10/3 please call patient to go over instructions npo guideline and confirm cd 9/24/2020

## 2021-06-12 NOTE — ANESTHESIA POSTPROCEDURE EVALUATION
Patient: Jamie Urban  Procedure(s):  ESOPHAGOGASTRODUODENOSCOPY (EGD)  Anesthesia type: MAC    Patient location: PACU  Last vitals:   Vitals Value Taken Time   BP 93/50 11/09/20 1217   Temp 36.9  C (98.5  F) 11/09/20 1217   Pulse 82 11/09/20 1345   Resp 20 11/09/20 1217   SpO2 93 % 11/09/20 1345   Vitals shown include unvalidated device data.  Post vital signs: stable  Level of consciousness: awake and responds to simple questions  Post-anesthesia pain: pain controlled  Post-anesthesia nausea and vomiting: no  Pulmonary: unassisted, return to baseline  Cardiovascular: stable and blood pressure at baseline  Hydration: adequate  Anesthetic events: no    QCDR Measures:  ASA# 11 - Maite-op Cardiac Arrest: ASA11B - Patient did NOT experience unanticipated cardiac arrest  ASA# 12 - Maite-op Mortality Rate: ASA12B - Patient did NOT die  ASA# 13 - PACU Re-Intubation Rate: ASA13B - Patient did NOT require a new airway mgmt  ASA# 10 - Composite Anes Safety: ASA10A - No serious adverse event    Additional Notes:

## 2021-06-12 NOTE — ANESTHESIA POSTPROCEDURE EVALUATION
Patient: Jamie Urban  Procedure(s):  ESOPHAGOGASTRODUODENOSCOPY (EGD)  Anesthesia type: MAC    Patient location: PACU  Last vitals:   Vitals Value Taken Time   BP 89/54 10/29/20 1518   Temp 36.7  C (98.1  F) 10/29/20 1518   Pulse 95 10/29/20 1525   Resp 21 10/29/20 1518   SpO2 96 % 10/29/20 1518   Vitals shown include unvalidated device data.  Post vital signs: stable  Level of consciousness: awake and responds to simple questions  Post-anesthesia pain: pain controlled  Post-anesthesia nausea and vomiting: no  Pulmonary: unassisted, face mask  Cardiovascular: stable and blood pressure at baseline  Hydration: adequate  Anesthetic events: no    QCDR Measures:  ASA# 11 - Maite-op Cardiac Arrest: ASA11B - Patient did NOT experience unanticipated cardiac arrest  ASA# 12 - Maite-op Mortality Rate: ASA12B - Patient did NOT die  ASA# 13 - PACU Re-Intubation Rate: NA - No ETT / LMA used for case  ASA# 10 - Composite Anes Safety: ASA10A - No serious adverse event    Additional Notes:

## 2021-06-12 NOTE — ANESTHESIA PREPROCEDURE EVALUATION
Anesthesia Evaluation      Patient summary reviewed     Airway   Mallampati: II  Neck ROM: full   Pulmonary - normal exam   (+) COPD, sleep apnea on CPAP, ,                          Cardiovascular   (+) hypertension, past MI, CAD, dysrhythmias, CHF, cardiomyopathy, hypercholesterolemia, PVD    Rhythm: irregular     ROS comment: TTE 10/30/20:    Limited exam performed    Left ventricle ejection fraction is moderately decreased. The calculated left ventricular ejection fraction is 33%.    Normal right ventricular size and systolic function.    When compared to the previous study dated 10/7/2020, apical wall motion abnormality appears new. Findings consistent with ischemic or Takotsubo cardiomyopathy.    Coronary cath 11/2/20:     Non-sustained ventricular tachycardia and depressed ejection fraction with apical akinesis in a patient admitted with severe anemia while on xarelto for severe PAD. Troponin 0.5.    Diffuse moderate left main and left circumflex disease with an occluded but collateralized OM-3.    Occluded LAD, along its entire length with no appreciated collateral flow to either the LAD or the diagonals.    Diffuse mild-moderate main body RCA disease with a severe lesion in the mid PDA, limiting flow to only the distal PDA. Moderate disease in the distal RCA AV grove continuation prior to the distal PL branch.    LV EDP 19-22 mmHg.     Neuro/Psych - negative ROS     Endo/Other    (+) obesity,      GI/Hepatic/Renal - negative ROS     Comments: Likely bleeding acutely from GI source          Dental    (+) caps, chipped and poor dentition                       Anesthesia Plan  Planned anesthetic: MAC    COVID neg 10/26    Hgb currently 8.0 after transfusions    AVOID zofran due to prolonged QtC and recent history of VTach + EF 33%    NO CPR - OK for intubation and defibrillation/cardioversion if needed  ASA 4 - emergent     Anesthetic plan and risks discussed with: patient    Post-op plan: routine  recovery

## 2021-06-12 NOTE — PROGRESS NOTES
Wife was notified of patient admitting to [facility]. Patient's transport ride notified and [facility] transported. Telephone report given to TATIANA Slaughter at [facility].

## 2021-06-12 NOTE — ANESTHESIA PROCEDURE NOTES
Arterial Line  Reason for Procedure: hemodynamic monitoring  Patient location during procedure: OR post-induction  Start time: 10/12/2020 12:38 PM  End time: 10/12/2020 12:41 PM  Staffing:  Performing  Anesthesiologist: Prashanth Thompson MD  Sterile Precautions:  sterile barriers used during insertion: cap, mask, sterile gloves, large sheet, and hand hygiene used.  Arterial Line:     Laterality: left  Location: radial  Prepped with: ChloroPrep    Needle gauge: 20 G  Number of Attempts: 1  Secured with: transparent dressing and tape  Flushed with: saline    Ultrasound evaluation of access site: yes  Vessel patent by US exam    Concurrent real time visualization of needle entry    Permanent ultrasound image captured

## 2021-06-12 NOTE — PATIENT INSTRUCTIONS - HE
Pt needs to be directly admitted to the hospital for Anemia. Bed Control contacted for admission to [facility]ist Dr. Smith. updated regarding patient and status and patient was accepted. Patient updated and educated on the plan and where to check in at the hospital.

## 2021-06-12 NOTE — ANESTHESIA POSTPROCEDURE EVALUATION
Patient: Jamie Urban  Procedure(s):  CREATION, BYPASS, ARTERIAL, FEMORAL TO POPLITEAL, USING REVERSED SAPHENOUS VEIN GRAFT, TROMBECTOMY OF LEFT POPLITEAL ARTERY AND TIBIAL-PERONEAL TRUNK (Left)  ANGIOGRAM, LEFT LOWER EXTREMITY (Left)  ENDARTERECTOMY, TIBIAL-PERONEAL TRUNK, LEFT, AND ANGIOPLASTY (Left)  Anesthesia type: general    Patient location: PACU  Last vitals:   Vitals Value Taken Time   /55 10/12/20 2045   Temp 37.1  C (98.8  F) 10/12/20 2045   Pulse 81 10/12/20 2050   Resp 16 10/12/20 2050   SpO2 96 % 10/12/20 2050   Vitals shown include unvalidated device data.  Post vital signs: stable  Level of consciousness: awake and responds to simple questions  Post-anesthesia pain: pain controlled  Post-anesthesia nausea and vomiting: no  Pulmonary: unassisted, return to baseline  Cardiovascular: stable and blood pressure at baseline  Hydration: adequate  Anesthetic events: no    QCDR Measures:  ASA# 11 - Maite-op Cardiac Arrest: ASA11B - Patient did NOT experience unanticipated cardiac arrest  ASA# 12 - Maite-op Mortality Rate: ASA12B - Patient did NOT die  ASA# 13 - PACU Re-Intubation Rate: ASA13B - Patient did NOT require a new airway mgmt  ASA# 10 - Composite Anes Safety: ASA10A - No serious adverse event    Additional Notes:

## 2021-06-12 NOTE — ANESTHESIA PREPROCEDURE EVALUATION
Anesthesia Evaluation      Patient summary reviewed   No history of anesthetic complications     Airway   Mallampati: III  Neck ROM: full   Pulmonary - normal exam   (+) COPD, shortness of breath, sleep apnea on CPAP, , a smoker                         Cardiovascular - normal exam  Exercise tolerance: < 4 METS  (+) hypertension, CAD, , PVD    ECG reviewed        Neuro/Psych - negative ROS     Endo/Other    (+) obesity,      GI/Hepatic/Renal    (+)   chronic renal disease CRI,           Dental - normal exam                        Anesthesia Plan  Planned anesthetic: MAC    ASA 4     Anesthetic plan and risks discussed with: patient and spouse    Post-op plan: routine recovery

## 2021-06-12 NOTE — ANESTHESIA CARE TRANSFER NOTE
Last vitals:   Vitals:    10/12/20 2012   BP:    Pulse: 82   Resp: 20   Temp:    SpO2:      Patient's level of consciousness is awake  Spontaneous respirations: yes  Maintains airway independently: yes  Dentition unchanged: yes  Oropharynx: oropharynx clear of all foreign objects    QCDR Measures:  ASA# 20 - Surgical Safety Checklist: WHO surgical safety checklist completed prior to induction    PQRS# 430 - Adult PONV Prevention: 4558F - Pt received => 2 anti-emetic agents (different classes) preop & intraop  ASA# 8 - Peds PONV Prevention: NA - Not pediatric patient, not GA or 2 or more risk factors NOT present  PQRS# 424 - Maite-op Temp Management: 4559F - At least one body temp DOCUMENTED => 35.5C or 95.9F within required timeframe  PQRS# 426 - PACU Transfer Protocol: - Transfer of care checklist used  ASA# 14 - Acute Post-op Pain: ASA14B - Patient did NOT experience pain >= 7 out of 10

## 2021-06-12 NOTE — ANESTHESIA PREPROCEDURE EVALUATION
Anesthesia Evaluation        Airway   Mallampati: IV  Neck ROM: full   Pulmonary     breath sounds clear to auscultation  (+) COPD, shortness of breath, sleep apnea on CPAP, , a smoker                         Cardiovascular - normal exam  (+) hypertension, CAD, , hypercholesterolemia, PVD (Popliteal artery aneurysm )    Pacemaker: Hx of kidney stones.  Rhythm: regular  Rate: normal,      ROS comment: Hx of AAA without rupture     Neuro/Psych      Endo/Other       Comments: Hyperparathyroidism   Osteoporosis     GI/Hepatic/Renal    (+)   chronic renal disease CRI,     Comments: BPH     Other findings: 10/7/2020 Echo Summary:      No previous study for comparison.    Normal left ventricular size, normal wall thickness and mild global hypokinesis. Left ventricle ejection fraction is mildly decreased. The calculated left ventricular ejection fraction is 46%.    Normal right ventricular size and systolic function.    Mildly enlarged left atrium.    No obvious valvular disease      Dental    (+) poor dentition                       Anesthesia Plan  Planned anesthetic: general endotracheal    ASA 3   Induction: intravenous   Anesthetic plan and risks discussed with: patient  Anesthesia plan special considerations: video-assisted, antiemetics, arterial catheterization, IV therapy two IVs,   Post-op plan: routine recovery

## 2021-06-12 NOTE — PROGRESS NOTES
VASCULAR SURGERY PROGRESS NOTE:  HPI: Mr. Urban is seen in hospital today for follow-up of his left SFA to below knee popliteal bypass along with left lower extremity angiogram with thrombectomy on 10/12/20.  He had a Preveena wound vac placed over his medial thigh incision and lower leg incision as he was having some serosanguinous drainage and was discharged to TCU with this in place.  He was started on Xarelto in addition to aspirin and statin at discharge.  He is in clinic today for wound check and hopeful Preveena wound vac removal however it is noted that since Friday his wound vac has put out >750 ml of serosanguinous fluid, in addition there is some concern for wound infection given complaints of increased pain at the proximal part of the lateral thigh incision.  Patient also endorses generalized fatigue and weakness.  In addition we were called from his TCU that his AM labs came back with a hemoglobin of 6.8, down from 9.0 on the day of discharge (10/20/20).  Given the concern for acute blood loss anemia and wound infection as well as irregular heart rate and after discussion/assessment with Dr. Ortiz we will send the patient to [facility] to get admitted for transfusion, diuresis, IV ABX, and wound care.     Denies chest pain, shortness of breath, fevers/chills.        Review of Systems - Negative except as noted above      Allergies   Allergen Reactions     Sulfa (Sulfonamide Antibiotics)      Varenicline Other (See Comments)     Other reaction(s): Hallucinations       Bupropion Anxiety and Other (See Comments)     Other reaction(s): Tremors  Patient unaware  Patient unaware         No current outpatient medications on file.    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  Has summer home on [facility]. , wife has diabetes and some other health issues. 4 children all here. 11/2/2009  See Huffman MD     Hyperlipidemia 12/12/2006     Hyperparathyroidism     Created by [employer] Annotation: Apr 14 2009  3:07PM - Tasha Guan: s/p resection  of R inf. adenoma 3/2009      Hypertension 10/25/2012     Lower urinary tract symptoms (LUTS) 1/2/2020     Nephrolithiasis 2/8/2007     Nicotine Dependence     Created by [employer] Annotation: Feb 18 2010  2:23PM - Tasha Guan: intolerant to  Chantix (vivid dreams)      Obstructive sleep apnea syndrome 10/25/2012    Setting: APAP 9-12 Supplied by: [employer] PSG done: 6-6-12 MSI (7/22/12 MSI titration) AHI 20 RDI 85 Lowest O2 Sat: 86% [place]/ [facility] Rx/nasal 10-3-16; 5/5/17 change to APAP; 5/30/17 pressure change     Osteoporosis     Created by Conversion  Replacement Utility updated for latest IMO load     Screen for colon cancer 11/2/2009    Colonoscopy 2007 normal by his report. Repeat 10 years. 11/2/2009  See Huffman MD     Severe obesity (BMI 35.0-39.9) with comorbidity (H) 11/22/2017     SIRS (systemic inflammatory response syndrome) (H) 6/6/2018     Vitamin D deficiency     Created by Conversion  Replacement Utility updated for latest IMO load       Past Surgical History:   Procedure Laterality Date     CATARACT EXTRACTION       EXTRACORPOREAL SHOCK WAVE LITHOTRIPSY       FEMORAL ARTERY - POPLITEAL ARTERY BYPASS GRAFT Left 10/12/2020    Procedure: CREATION, BYPASS, ARTERIAL, FEMORAL TO POPLITEAL, USING REVERSED SAPHENOUS VEIN GRAFT, TROMBECTOMY OF LEFT POPLITEAL ARTERY AND TIBIAL-PERONEAL TRUNK;  Surgeon:  Maria Esther Ortiz MD;  Location: [facility] OR;  Service: General     IR ARTERIAL THROMBOLYTIC INFUSION FIRST DAY  10/6/2020     IR EXTREMITY ANGIOGRAM LEFT  10/6/2020     PARATHYROIDECTOMY         Social History     Socioeconomic History     Marital status:      Spouse name: Not on file     Number of children: Not on file     Years of education: Not on file     Highest education level: Not on file   Occupational History     Not on file   Social Needs     Financial resource strain: Not on file     Food insecurity     Worry: Not on file     Inability: Not on file     Transportation needs     Medical: Not on file     Non-medical: Not on file   Tobacco Use     Smoking status: Former Smoker     Packs/day: 0.50     Years: 45.00     Pack years: 22.50     Quit date: 2011     Years since quittin.9     Smokeless tobacco: Former User     Quit date: 2011   Substance and Sexual Activity     Alcohol use: Yes     Comment: < 1-2 monyhly     Drug use: Never     Sexual activity: Not on file   Lifestyle     Physical activity     Days per week: Not on file     Minutes per session: Not on file     Stress: Not on file   Relationships     Social connections     Talks on phone: Not on file     Gets together: Not on file     Attends Taoist service: Not on file     Active member of club or organization: Not on file     Attends meetings of clubs or organizations: Not on file     Relationship status: Not on file     Intimate partner violence     Fear of current or ex partner: Not on file     Emotionally abused: Not on file     Physically abused: Not on file     Forced sexual activity: Not on file   Other Topics Concern     Not on file   Social History Narrative     Not on file     Allergies, Medications, Social History, Past Medical History and Past Surgical History were reviewed and are noted in the chart.    OBJECTIVE:  BP 96/46   Pulse 62   Temp 97.9  F (36.6  C)   Resp 14   There is no height or  weight on file to calculate BMI.    EXAM:  Constitutional: alert, no acute distress, cooperative   Cardiovascular: irregularly irregular  Respiratory: breathing unlabored without secondary muscle use  Psychiatric: mentation appears normal and affect normal. pt interactive and answers questions appropriately  Neck: no asymmetry  GI/Abdomen: abdomen soft, non-tender. No palpable masses  MSK: able to move all extremities, generalized weakness  Groin: right groin puncture site with small amount of serosanguinous oozing  Extremities: left upper thigh lateral incision with surrounding erythema, edema, and tender to palpation on the uppermost portion.  Wound VAC removed from medial thigh and medial calf incisions, noted to have open area on the distal aspect of the medial thigh incision with fibrinous tissue and sanguinous drainage.  Lower incision with staples intact, no s/s of infection at this portion.  BLE with 3+ edema  Pulses: Left DP/PT signals audible via doppler    Assessment/Plan: Mr. Urban is seen in clinic today for wound check s/p left SFA to below knee popliteal bypass.  He complains of generalized weakness and increasing pain along the proximal part of his lateral thigh incision.  Hemoglobin drawn this AM reveals a drop in hgb from 9.0 at discharge on 10/20 to 6.8 today.  Upon assessment of his wounds it is noted that his lateral thigh wound is concerning for infection and a small area of the medial thigh incision appears to be opening on the distal portion and has ongoing sanguinous oozing.    Given his acute blood loss anemia and post op wound infection we will send him to [facility] as a direct admit.  He will need a blood transfusion, ABX, and wound care.  I discussed this plan with the hospitalist Dr. Smith who will accept the patient for admission.   The clinic team will contact family and update accepting floor with report on the patient.     Ramses Gotti NP  Vascular surgery service

## 2021-06-12 NOTE — ANESTHESIA CARE TRANSFER NOTE
Last vitals:   Vitals:    11/09/20 1050   BP: 91/55   Pulse: 86   Resp: 20   Temp: 37  C (98.6  F)   SpO2: 99%     Patient's level of consciousness is awake  Spontaneous respirations: yes  Maintains airway independently: yes  Dentition unchanged: yes  Oropharynx: oropharynx clear of all foreign objects    QCDR Measures:  ASA# 20 - Surgical Safety Checklist: WHO surgical safety checklist completed prior to induction    PQRS# 430 - Adult PONV Prevention: NA - Not adult patient, not GA or 3 or more risk factors NOT present  ASA# 8 - Peds PONV Prevention: NA - Not pediatric patient, not GA or 2 or more risk factors NOT present  PQRS# 424 - Maite-op Temp Management: 4559F - At least one body temp DOCUMENTED => 35.5C or 95.9F within required timeframe  PQRS# 426 - PACU Transfer Protocol: - Transfer of care checklist used  ASA# 14 - Acute Post-op Pain: ASA14B - Patient did NOT experience pain >= 7 out of 10

## 2021-06-12 NOTE — PROGRESS NOTES
Patient is in clinic today to see TRAVIS Gotti.      Patient is here for a post op to discuss bypass, with prevena on with 750mL out since Friday 10/23 according to prevena rep.     The patient does not smoke.    Pt is currently taking aspirin, Xarelto and statin.    Pt rates pain 0/10     The provider has been notified that the patient has no complaints.     Update from Henri at [facility] in [place] that pt hemoglobin is 6.8, Ramses Gotti and Dr. Ortiz notified.    Updated [facility] with patient status of admitting to [facility]. When pt is ready to go back to [facility], Admissions number is [phone]

## 2021-06-12 NOTE — ANESTHESIA CARE TRANSFER NOTE
Last vitals:   Vitals:    10/29/20 1457   BP: 96/72   Pulse: 82   Resp: 14   Temp: 36.4  C (97.5  F)   SpO2: 99%     Patient's level of consciousness is alert and awake, but tired  Spontaneous respirations: yes  Maintains airway independently: yes  Dentition unchanged: yes  Oropharynx: oropharynx clear of all foreign objects    QCDR Measures:  ASA# 20 - Surgical Safety Checklist: WHO surgical safety checklist completed prior to induction    PQRS# 430 - Adult PONV Prevention: 4558F - Pt received => 2 anti-emetic agents (different classes) preop & intraop  ASA# 8 - Peds PONV Prevention: NA - Not pediatric patient, not GA or 2 or more risk factors NOT present  PQRS# 424 - Maite-op Temp Management: 4559F - At least one body temp DOCUMENTED => 35.5C or 95.9F within required timeframe  PQRS# 426 - PACU Transfer Protocol: - Transfer of care checklist used  ASA# 14 - Acute Post-op Pain: ASA14B - Patient did NOT experience pain >= 7 out of 10

## 2021-06-13 NOTE — TELEPHONE ENCOUNTER
Patient's son Neil requesting a call back from Dr. Ortiz today if possible as he has more questions.

## 2021-07-03 NOTE — ADDENDUM NOTE
Addendum Note by Harmeet Parks, RN at 9/24/2020 10:58 AM     Author: Harmeet Parks, RN Service: -- Author Type: Registered Nurse    Filed: 9/24/2020 11:04 AM Encounter Date: 9/24/2020 Status: Signed    : Harmeet Parks RN (Registered Nurse)    Addended by: HARMEET PARKS on: 9/24/2020 11:04 AM        Modules accepted: Orders

## 2021-07-03 NOTE — ADDENDUM NOTE
Addendum Note by Vibha Bangura RN at 9/21/2020  3:57 PM     Author: Vibha Bangura RN Service: -- Author Type: Registered Nurse    Filed: 9/21/2020  3:57 PM Encounter Date: 9/21/2020 Status: Signed    : Vibha Bangura RN (Registered Nurse)    Addended by: VIBHA BANGURA on: 9/21/2020 03:57 PM        Modules accepted: Orders

## 2021-08-08 ENCOUNTER — HEALTH MAINTENANCE LETTER (OUTPATIENT)
Age: 81
End: 2021-08-08

## 2021-10-03 ENCOUNTER — HEALTH MAINTENANCE LETTER (OUTPATIENT)
Age: 81
End: 2021-10-03

## 2022-01-18 VITALS
HEIGHT: 67 IN | HEIGHT: 67 IN | WEIGHT: 204 LBS | WEIGHT: 204 LBS | BODY MASS INDEX: 32.02 KG/M2 | BODY MASS INDEX: 32.02 KG/M2

## 2022-01-18 VITALS
DIASTOLIC BLOOD PRESSURE: 70 MMHG | HEART RATE: 68 BPM | TEMPERATURE: 98.8 F | RESPIRATION RATE: 18 BRPM | SYSTOLIC BLOOD PRESSURE: 130 MMHG

## 2022-01-18 VITALS — BODY MASS INDEX: 36.57 KG/M2 | WEIGHT: 233 LBS | HEIGHT: 67 IN

## 2022-01-18 VITALS — WEIGHT: 218.9 LBS | HEIGHT: 67 IN | BODY MASS INDEX: 34.36 KG/M2

## 2022-01-18 VITALS
TEMPERATURE: 97.9 F | RESPIRATION RATE: 14 BRPM | HEART RATE: 62 BPM | SYSTOLIC BLOOD PRESSURE: 96 MMHG | DIASTOLIC BLOOD PRESSURE: 46 MMHG

## 2022-09-10 ENCOUNTER — HEALTH MAINTENANCE LETTER (OUTPATIENT)
Age: 82
End: 2022-09-10

## 2023-10-01 ENCOUNTER — HEALTH MAINTENANCE LETTER (OUTPATIENT)
Age: 83
End: 2023-10-01